# Patient Record
Sex: MALE | Race: WHITE | NOT HISPANIC OR LATINO | Employment: OTHER | URBAN - NONMETROPOLITAN AREA
[De-identification: names, ages, dates, MRNs, and addresses within clinical notes are randomized per-mention and may not be internally consistent; named-entity substitution may affect disease eponyms.]

---

## 2017-03-14 ENCOUNTER — OFFICE VISIT (OUTPATIENT)
Dept: CARDIOLOGY | Facility: CLINIC | Age: 72
End: 2017-03-14

## 2017-03-14 VITALS
DIASTOLIC BLOOD PRESSURE: 70 MMHG | HEIGHT: 69 IN | HEART RATE: 75 BPM | WEIGHT: 213 LBS | SYSTOLIC BLOOD PRESSURE: 128 MMHG | BODY MASS INDEX: 31.55 KG/M2

## 2017-03-14 DIAGNOSIS — Z79.899 LONG TERM CURRENT USE OF ANTIARRHYTHMIC MEDICAL THERAPY: ICD-10-CM

## 2017-03-14 DIAGNOSIS — I10 ESSENTIAL HYPERTENSION: ICD-10-CM

## 2017-03-14 DIAGNOSIS — I48.0 PAROXYSMAL ATRIAL FIBRILLATION (HCC): Primary | ICD-10-CM

## 2017-03-14 DIAGNOSIS — E78.00 HYPERCHOLESTEREMIA: ICD-10-CM

## 2017-03-14 PROCEDURE — 93000 ELECTROCARDIOGRAM COMPLETE: CPT | Performed by: NURSE PRACTITIONER

## 2017-03-14 PROCEDURE — 99213 OFFICE O/P EST LOW 20 MIN: CPT | Performed by: NURSE PRACTITIONER

## 2017-03-14 RX ORDER — VALSARTAN 80 MG/1
TABLET ORAL
COMMUNITY
End: 2018-09-27 | Stop reason: DRUGHIGH

## 2017-09-08 ENCOUNTER — TELEPHONE (OUTPATIENT)
Dept: CARDIOLOGY | Facility: CLINIC | Age: 72
End: 2017-09-08

## 2017-09-08 RX ORDER — FLECAINIDE ACETATE 50 MG/1
50 TABLET ORAL EVERY 12 HOURS
Qty: 180 TABLET | Refills: 0 | Status: SHIPPED | OUTPATIENT
Start: 2017-09-08 | End: 2017-09-18 | Stop reason: DRUGHIGH

## 2017-09-18 ENCOUNTER — OFFICE VISIT (OUTPATIENT)
Dept: CARDIOLOGY | Facility: CLINIC | Age: 72
End: 2017-09-18

## 2017-09-18 VITALS
DIASTOLIC BLOOD PRESSURE: 90 MMHG | HEIGHT: 69 IN | SYSTOLIC BLOOD PRESSURE: 138 MMHG | HEART RATE: 76 BPM | BODY MASS INDEX: 31.55 KG/M2 | WEIGHT: 213 LBS

## 2017-09-18 DIAGNOSIS — I48.0 PAF (PAROXYSMAL ATRIAL FIBRILLATION) (HCC): Primary | ICD-10-CM

## 2017-09-18 DIAGNOSIS — E78.49 OTHER HYPERLIPIDEMIA: ICD-10-CM

## 2017-09-18 DIAGNOSIS — I10 ESSENTIAL HYPERTENSION: ICD-10-CM

## 2017-09-18 DIAGNOSIS — I34.0 NON-RHEUMATIC MITRAL REGURGITATION: ICD-10-CM

## 2017-09-18 PROBLEM — E78.5 HYPERLIPEMIA: Status: ACTIVE | Noted: 2017-09-18

## 2017-09-18 PROCEDURE — 99214 OFFICE O/P EST MOD 30 MIN: CPT | Performed by: NURSE PRACTITIONER

## 2017-09-18 PROCEDURE — 93000 ELECTROCARDIOGRAM COMPLETE: CPT | Performed by: NURSE PRACTITIONER

## 2017-09-18 RX ORDER — FOLIC ACID 1 MG/1
1 TABLET ORAL DAILY
COMMUNITY

## 2017-09-18 RX ORDER — FLECAINIDE ACETATE 100 MG/1
TABLET ORAL
Qty: 60 TABLET | Refills: 8 | Status: SHIPPED | OUTPATIENT
Start: 2017-09-18 | End: 2018-03-29 | Stop reason: HOSPADM

## 2017-09-18 RX ORDER — PREDNISONE 1 MG/1
TABLET ORAL
COMMUNITY
End: 2018-03-29 | Stop reason: ALTCHOICE

## 2017-09-18 NOTE — PROGRESS NOTES
Chief Complaint   Patient presents with   • Follow-up     Says he has been feeling like he is in a fib. Denies chest pains. Says he has had shortness of breath, but attributes it to weight. PCP did blood work about month ago, but not in chart.    • Med Refill     Denies needing refills.        Cardiac Complaints  palpitations      Subjective   Jonn Cespedes is a 72 y.o. male with hypertension, hypercholesterolemia, and arrhythmias in the form of PAF.  He has done well with Tambocor.  Most recent cardiac workup in 2016 was negative for ischemic burden and slightly diminished LV function at 46%, home medication was continued.  He comes today for follow up and reports he has been noticing more atrial fibrillation.  He says for the last 3 weeks he has noticed more palpitations/arrythmias.  He has been taking his flecainide three times a day most often.  He is tolerating his eliquis without concern. No chest pain, shortness of breath, and dizziness reported. He does admit to an issue with blood clot in his right leg this summer after a long trip and flying.  He had been anticoagulation and says has resolved.  He is now following Dr. Patterson in regards to rheumatoid arthritis and is now taking methotrexate.  Labs are done with PCP and Dr. Patterson he states most recent were a month ago.  No refills are needed.         Cardiac History  Past Surgical History:   Procedure Laterality Date   • CARDIOVASCULAR STRESS TEST  01/11/2012    Stress-6min, 89%THR, 170-76, EF 46%, Negative   • CARDIOVASCULAR STRESS TEST  08/11/2016    6 min 16 sec, 87% THr, 172/76, no definite ischemia   • CARDIOVASCULAR STRESS TEST  08/11/2016    EF 46%, no ischemia   • CATH LAB PROCEDURE  01/30/2012    Cath-Normal Coronaries. EF 45%   • CHOLECYSTECTOMY     • CONVERTED (HISTORICAL) HOLTER  10/25/2011    Holter-freq PAC's and PVC's, freq runs V-tach   • ECHO - CONVERTED  10/28/2011    Echo=EF 50%. RVSP-47mmHg.   • ECHO - CONVERTED  07/23/2013    Echo-EF  60-65%, mild to mod MR.   • ECHO - CONVERTED  08/09/2016    EF 60-65%, mild MR, mild AR, RVSP 30-35 mmHg   • EP STUDY  12/20/2011    EP Study () no inducible V-tach, multifocal PACs and atrial fibrillation       Current Outpatient Prescriptions   Medication Sig Dispense Refill   • apixaban (ELIQUIS) 5 MG tablet tablet Take 5 mg by mouth 2 (Two) Times a Day.     • folic acid (FOLVITE) 1 MG tablet Take 1 mg by mouth Daily.     • methotrexate (RHEUMATREX) 2.5 MG tablet Take 5 tablets by mouth once a week.     • pravastatin (PRAVACHOL) 40 MG tablet Take 1 tablet by mouth daily. 90 tablet 3   • predniSONE (DELTASONE) 5 MG tablet Take 2 tablets by mouth once a day.     • valsartan (DIOVAN) 80 MG tablet 2 tabs daily     • aspirin 81 MG tablet Take 2 tablets by mouth 1 (One) Time for 1 dose. 30 tablet 11   • flecainide (TAMBOCOR) 100 MG tablet Take whole tablet in AM and 1/2 tablet PM 60 tablet 8     No current facility-administered medications for this visit.        Review of patient's allergies indicates no known allergies.    Past Medical History:   Diagnosis Date   • A-fib    • Deep vein thrombosis    • History of cholecystectomy    • Hypertension    • RA (rheumatoid arthritis)        Social History     Social History   • Marital status:      Spouse name: N/A   • Number of children: N/A   • Years of education: N/A     Occupational History   • Not on file.     Social History Main Topics   • Smoking status: Never Smoker   • Smokeless tobacco: Never Used   • Alcohol use No   • Drug use: No   • Sexual activity: Not on file     Other Topics Concern   • Not on file     Social History Narrative       Family History   Problem Relation Age of Onset   • No Known Problems Mother        Review of Systems   Constitution: Negative for weakness and malaise/fatigue.   Cardiovascular: Positive for irregular heartbeat and palpitations. Negative for chest pain, dyspnea on exertion, near-syncope and syncope.  "  Respiratory: Negative for shortness of breath and wheezing.    Musculoskeletal: Positive for arthritis, joint pain and joint swelling.   Gastrointestinal: Negative for anorexia, heartburn, melena, nausea and vomiting.   Genitourinary: Negative for dysuria, hematuria and nocturia.   Neurological: Negative for dizziness, focal weakness and light-headedness.   Psychiatric/Behavioral: Negative for altered mental status and depression.       DiabetesNo  Thyroidnormal    Objective     /90 (BP Location: Left arm)  Pulse 76  Ht 69\" (175.3 cm)  Wt 213 lb (96.6 kg)  BMI 31.45 kg/m2    Physical Exam   Constitutional: He is oriented to person, place, and time. He appears well-developed and well-nourished.   HENT:   Head: Normocephalic and atraumatic.   Eyes: EOM are normal. Pupils are equal, round, and reactive to light.   Neck: Normal range of motion. Neck supple.   Cardiovascular: Normal rate and regular rhythm.    Murmur heard.  Pulmonary/Chest: Effort normal and breath sounds normal.   Abdominal: Soft. Bowel sounds are normal.   Musculoskeletal: Normal range of motion.   Neurological: He is alert and oriented to person, place, and time.   Skin: Skin is warm and dry.   Psychiatric: He has a normal mood and affect.         ECG 12 Lead  Date/Time: 9/18/2017 2:43 PM  Performed by: VIDHI KENNEDY  Authorized by: VIDHI KENNEDY   Rhythm: sinus rhythm  BPM: 76  Clinical impression: normal ECG            Assessment/Plan     HR and BP are both stable.  EKG done today for PAF and tambocor therapy shows a NSR with normal QT.  Since he does report some issues with more frequent palpitations, we will advie to increase his tambocor to 100mg in AM and 1/2 tablet in PM.  He will return next week for EKG.  No new workup will be advised as he denies any new concerns.  Labs are done with your office and rheumatology, he states most recent done about a month ago.  Could we have a copy for our records?  He states his right leg " pain has subsided since DVT in August.  Since he is now on concomitant therapy with methotrexate as well as aspirin and eliquis, we will decrease his aspirin to 81mg BID. If he does well and no more issues with clotting, most likely we will cut his aspirin to 81 mg daily.  Good cardiac diet and activity as tolerated advised.  6 month follow up advised or sooner if needed.      Problems Addressed this Visit        Cardiovascular and Mediastinum    PAF (paroxysmal atrial fibrillation) - Primary    Relevant Orders    ECG 12 Lead    HTN (hypertension)    Hyperlipemia    Non-rheumatic mitral regurgitation                  Electronically signed by MARINO Ann September 18, 2017 4:52 PM

## 2017-09-25 ENCOUNTER — TELEPHONE (OUTPATIENT)
Dept: CARDIOLOGY | Facility: CLINIC | Age: 72
End: 2017-09-25

## 2017-09-25 ENCOUNTER — CLINICAL SUPPORT (OUTPATIENT)
Dept: CARDIOLOGY | Facility: CLINIC | Age: 72
End: 2017-09-25

## 2017-09-25 DIAGNOSIS — I48.0 PAF (PAROXYSMAL ATRIAL FIBRILLATION) (HCC): Primary | ICD-10-CM

## 2017-09-25 PROCEDURE — 93000 ELECTROCARDIOGRAM COMPLETE: CPT | Performed by: NURSE PRACTITIONER

## 2017-09-25 NOTE — TELEPHONE ENCOUNTER
Patient aware of EKG results and says palpitations have improved. He will continue medications as prescribed.

## 2017-09-25 NOTE — PROGRESS NOTES
Procedure     ECG 12 Lead  Date/Time: 9/25/2017 9:43 AM  Performed by: VIDHI KENNEDY  Authorized by: VIDHI KENNEDY   Rhythm: sinus rhythm  BPM: 68  Clinical impression: normal ECG

## 2018-03-29 ENCOUNTER — OFFICE VISIT (OUTPATIENT)
Dept: CARDIOLOGY | Facility: CLINIC | Age: 73
End: 2018-03-29

## 2018-03-29 VITALS
WEIGHT: 199 LBS | DIASTOLIC BLOOD PRESSURE: 82 MMHG | HEART RATE: 67 BPM | HEIGHT: 69 IN | BODY MASS INDEX: 29.47 KG/M2 | SYSTOLIC BLOOD PRESSURE: 124 MMHG

## 2018-03-29 DIAGNOSIS — I10 ESSENTIAL HYPERTENSION: ICD-10-CM

## 2018-03-29 DIAGNOSIS — I34.0 NON-RHEUMATIC MITRAL REGURGITATION: ICD-10-CM

## 2018-03-29 DIAGNOSIS — E66.3 OVERWEIGHT (BMI 25.0-29.9): ICD-10-CM

## 2018-03-29 DIAGNOSIS — I48.0 PAF (PAROXYSMAL ATRIAL FIBRILLATION) (HCC): Primary | ICD-10-CM

## 2018-03-29 DIAGNOSIS — E78.49 OTHER HYPERLIPIDEMIA: ICD-10-CM

## 2018-03-29 DIAGNOSIS — R00.2 PALPITATIONS: ICD-10-CM

## 2018-03-29 PROCEDURE — 93000 ELECTROCARDIOGRAM COMPLETE: CPT | Performed by: NURSE PRACTITIONER

## 2018-03-29 PROCEDURE — 99214 OFFICE O/P EST MOD 30 MIN: CPT | Performed by: NURSE PRACTITIONER

## 2018-03-29 RX ORDER — FLECAINIDE ACETATE 50 MG/1
50 TABLET ORAL 2 TIMES DAILY
Qty: 180 TABLET | Refills: 3 | Status: SHIPPED | OUTPATIENT
Start: 2018-03-29 | End: 2018-09-27 | Stop reason: SDUPTHER

## 2018-03-29 RX ORDER — ASPIRIN 325 MG
325 TABLET, DELAYED RELEASE (ENTERIC COATED) ORAL DAILY
COMMUNITY
End: 2018-03-29 | Stop reason: DRUGHIGH

## 2018-03-29 NOTE — PROGRESS NOTES
Chief Complaint   Patient presents with   • Follow-up     For cardiac management. Reports he is no long on Eliquis per PCP, is now taking aspirin 325 mg QD. Reports he rarely get palpitations.    • Med Refill     Reports that he has been taking flecainide 50 mg BID instead of 100 mg in the morning and 50 mg at night. Wondering if he could get new script?       Cardiac Complaints  palpitations      Subjective   Jonn Cespedes is a 73 y.o. male with hypertension, hypercholesterolemia, and arrhythmias in the form of PAF.  He has done well with Tambocor.  Most recent cardiac workup in 2016 was negative for ischemic burden and slightly diminished LV function at 46%, home medication was continued.  At last visit, he had been noticing more atrial fibrillation, and was taking flecainide 3 times a day most often.  Flecainide was increased at that time to 100mg in AM and 1/2 tablet in the PM.  He returns today for follow up and denies any new cardiac concerns.  He states that his flecainide has been decreased by him as he was having side effects.  He does report PCP recently stopped his eliquis and told him to take ASA alone.  His CHADsvasc2 score is 2.  He does state he continually has palpitations but they are no worse than before and actually have improved. Labs he reports with PCP, no recent copy is available for review.  Refills of flecainide requested.       Cardiac History  Past Surgical History:   Procedure Laterality Date   • CARDIOVASCULAR STRESS TEST  01/11/2012    Stress-6min, 89%THR, 170-76, EF 46%, Negative   • CARDIOVASCULAR STRESS TEST  08/11/2016    6 min 16 sec, 87% THr, 172/76, no definite ischemia   • CARDIOVASCULAR STRESS TEST  08/11/2016    EF 46%, no ischemia   • CATH LAB PROCEDURE  01/30/2012    Cath-Normal Coronaries. EF 45%   • CHOLECYSTECTOMY     • CONVERTED (HISTORICAL) HOLTER  10/25/2011    Holter-freq PAC's and PVC's, freq runs V-tach   • ECHO - CONVERTED  10/28/2011    Echo=EF 50%.  RVSP-47mmHg.   • ECHO - CONVERTED  07/23/2013    Echo-EF 60-65%, mild to mod MR.   • ECHO - CONVERTED  08/09/2016    EF 60-65%, mild MR, mild AR, RVSP 30-35 mmHg   • EP STUDY  12/20/2011    EP Study () no inducible V-tach, multifocal PACs and atrial fibrillation       Current Outpatient Prescriptions   Medication Sig Dispense Refill   • folic acid (FOLVITE) 1 MG tablet Take 1 mg by mouth Daily.     • methotrexate (RHEUMATREX) 2.5 MG tablet Take 5 tablets by mouth once a week.     • pravastatin (PRAVACHOL) 40 MG tablet Take 1 tablet by mouth daily. 90 tablet 3   • valsartan (DIOVAN) 80 MG tablet 2 tabs daily     • apixaban (ELIQUIS) 5 MG tablet tablet Take 1 tablet by mouth Every 12 (Twelve) Hours. 60 tablet 0   • aspirin 81 MG tablet Take 1 tablet by mouth Daily. 30 tablet 11   • flecainide (TAMBOCOR) 50 MG tablet Take 1 tablet by mouth 2 (Two) Times a Day. 180 tablet 3     No current facility-administered medications for this visit.        Review of patient's allergies indicates no known allergies.    Past Medical History:   Diagnosis Date   • A-fib    • Deep vein thrombosis    • History of cholecystectomy    • Hypertension    • RA (rheumatoid arthritis)        Social History     Social History   • Marital status:      Spouse name: N/A   • Number of children: N/A   • Years of education: N/A     Occupational History   • Not on file.     Social History Main Topics   • Smoking status: Never Smoker   • Smokeless tobacco: Never Used   • Alcohol use No   • Drug use: No   • Sexual activity: Not on file     Other Topics Concern   • Not on file     Social History Narrative   • No narrative on file       Family History   Problem Relation Age of Onset   • No Known Problems Mother        Review of Systems   Constitution: Negative for weakness and malaise/fatigue.   Cardiovascular: Positive for palpitations. Negative for chest pain, dyspnea on exertion, irregular heartbeat, leg swelling, near-syncope and  "syncope.   Respiratory: Negative for shortness of breath and wheezing.    Musculoskeletal: Negative for back pain, joint pain and joint swelling.   Gastrointestinal: Negative for anorexia, heartburn, melena and nausea.   Genitourinary: Negative for dysuria, hematuria, hesitancy and nocturia.   Neurological: Negative for headaches, light-headedness, numbness and paresthesias.   Psychiatric/Behavioral: Negative for depression and memory loss. The patient is not nervous/anxious.        DiabetesNo  Thyroidnormal    Objective     /82 (BP Location: Left arm)   Pulse 67   Ht 175.3 cm (69.02\")   Wt 90.3 kg (199 lb)   BMI 29.37 kg/m²     Physical Exam   Constitutional: He is oriented to person, place, and time. He appears well-developed and well-nourished.   HENT:   Head: Normocephalic and atraumatic.   Eyes: EOM are normal. Pupils are equal, round, and reactive to light.   Neck: Normal range of motion. Neck supple.   Cardiovascular: Normal rate and regular rhythm.    Murmur heard.  Pulmonary/Chest: Effort normal and breath sounds normal.   Abdominal: Soft.   Musculoskeletal: Normal range of motion.   Neurological: He is alert and oriented to person, place, and time.   Skin: Skin is warm and dry.   Psychiatric: He has a normal mood and affect. His behavior is normal.         ECG 12 Lead  Date/Time: 3/29/2018 10:46 AM  Performed by: VIDHI KENNEDY  Authorized by: VIDHI KENNEDY   Rhythm: sinus rhythm  BPM: 67  Clinical impression: normal ECG  Comments: Normal QT            Assessment/Plan     HR and BP are stable today.  EKG done today for PAF showed NSR with normal QT.  He is on flecainide at 50mg BID, he decreased it himself, but he is tolerating well, says palpitations have been well controlled. He remains on diovan for HTN and his blood pressure is well managed.  He is currently on ASA therapy alone at 325mg EC alone since he stopped his eliquis after 6 months for DVT.  We discussed that he needed to drop " his ASA 81mg daily and restart eliquis 5mg BID for CHADS vasc score 2. He is agreeable to restart. He has medication at home and will restart as bleeding or side effects with eliquis denied.  Labs are done with your office, could we get most recent copy for our records?  Refills of both tambocor and eliquis are requested. BMI elevated today at 29, patient encouraged to decrease caloric intake, carbs, starches, and increase physical activity with a walking plan advised for 30 minutes 3-4 times weekly.  No new testing encouraged today as no new problems reported.  6 month follow up advised or sooner if needed.       Problems Addressed this Visit        Cardiovascular and Mediastinum    PAF (paroxysmal atrial fibrillation) - Primary    HTN (hypertension)    Hyperlipemia    Non-rheumatic mitral regurgitation      Other Visit Diagnoses     Overweight (BMI 25.0-29.9)        Palpitations              Discussed the patient's BMI with him. BMI is above normal parameters. Follow-up plan includes:  nutrition counseling.        Electronically signed by MARINO Ann March 29, 2018 10:56 AM

## 2018-06-27 ENCOUNTER — TELEPHONE (OUTPATIENT)
Dept: CARDIOLOGY | Facility: CLINIC | Age: 73
End: 2018-06-27

## 2018-06-27 NOTE — TELEPHONE ENCOUNTER
Patient called needing refills on Eliquis. Script sent to [facility].

## 2018-09-27 ENCOUNTER — OFFICE VISIT (OUTPATIENT)
Dept: CARDIOLOGY | Facility: CLINIC | Age: 73
End: 2018-09-27

## 2018-09-27 VITALS
HEART RATE: 61 BPM | DIASTOLIC BLOOD PRESSURE: 70 MMHG | HEIGHT: 69 IN | BODY MASS INDEX: 30.36 KG/M2 | WEIGHT: 205 LBS | SYSTOLIC BLOOD PRESSURE: 132 MMHG

## 2018-09-27 DIAGNOSIS — I48.0 PAF (PAROXYSMAL ATRIAL FIBRILLATION) (HCC): Primary | ICD-10-CM

## 2018-09-27 DIAGNOSIS — Z79.01 CURRENT USE OF LONG TERM ANTICOAGULATION: ICD-10-CM

## 2018-09-27 DIAGNOSIS — E78.49 OTHER HYPERLIPIDEMIA: ICD-10-CM

## 2018-09-27 DIAGNOSIS — Z79.899 LONG TERM CURRENT USE OF ANTIARRHYTHMIC DRUG: ICD-10-CM

## 2018-09-27 DIAGNOSIS — I10 ESSENTIAL HYPERTENSION: ICD-10-CM

## 2018-09-27 DIAGNOSIS — I34.0 NON-RHEUMATIC MITRAL REGURGITATION: ICD-10-CM

## 2018-09-27 PROCEDURE — 99213 OFFICE O/P EST LOW 20 MIN: CPT | Performed by: NURSE PRACTITIONER

## 2018-09-27 PROCEDURE — 93000 ELECTROCARDIOGRAM COMPLETE: CPT | Performed by: NURSE PRACTITIONER

## 2018-09-27 RX ORDER — VALSARTAN 160 MG/1
160 TABLET ORAL DAILY
COMMUNITY
End: 2018-09-27 | Stop reason: SDUPTHER

## 2018-09-27 RX ORDER — PRAVASTATIN SODIUM 40 MG
40 TABLET ORAL DAILY
Qty: 90 TABLET | Refills: 2 | Status: SHIPPED | OUTPATIENT
Start: 2018-09-27 | End: 2019-04-10 | Stop reason: SDUPTHER

## 2018-09-27 RX ORDER — FLECAINIDE ACETATE 50 MG/1
TABLET ORAL
Qty: 180 TABLET | Refills: 2 | Status: SHIPPED | OUTPATIENT
Start: 2018-09-27 | End: 2019-05-02 | Stop reason: SDUPTHER

## 2018-09-27 RX ORDER — VALSARTAN 160 MG/1
160 TABLET ORAL DAILY
Qty: 90 TABLET | Refills: 2 | Status: SHIPPED | OUTPATIENT
Start: 2018-09-27 | End: 2019-05-02 | Stop reason: ALTCHOICE

## 2018-09-27 RX ORDER — ASPIRIN 325 MG
325 TABLET, DELAYED RELEASE (ENTERIC COATED) ORAL DAILY
COMMUNITY
End: 2018-09-27 | Stop reason: DRUGHIGH

## 2018-09-27 NOTE — PROGRESS NOTES
"Chief Complaint   Patient presents with   • Follow-up     Cardiac management. He reports will at times have some AFib, yet not often. No labs in the last year. He states \"I decreased my Eliquis to 2.5mg bid due to headaches and increased my aspirin to 325mg daily\".   • Shortness of Breath     He states \"its the same, nothing unusual\".   • Med Refill     Needs refills on cardiac medication-90 day.       Subjective       Jonn Cespedes is a 73 y.o. male with hypertension, hypercholesterolemia, and arrhythmias in the form of PAF. He also had short runs of VT and underwent EP study in 2011 but no inducible VT but multiple PAC and AF.  He has done well with Tambocor.  Most recent cardiac workup in 2016 was negative for ischemia and slightly diminished LV function at 46%, home medication was continued. He is taking flecainide 100 mg in the morning and 50 mg in the evening. Occasionally he will take an extra 50 mg at night. Today he came for follow up. He denies chest pain or shortness of breath. Palpitations occur once every couple of weeks and he continues to take an extra 50 mg at night which helps. His CHADsVASc score is 2. He has reduced Eliquis to 2.5 mg and increased aspirin to 325 mg. Labs are followed with Dr. Weiss but none in the last one year. He plans to follow up with him soon and will forward copy to us. 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 Refills requested. RA is followed by Dr. Patterson.     HPI         Cardiac History:    Past Surgical History:   Procedure Laterality Date   • CARDIAC CATHETERIZATION  01/30/2012    Cath-Normal Coronaries. EF 45%   • CARDIOVASCULAR STRESS TEST  01/11/2012    Stress-6min, 89%THR, 170-76, EF 46%, Negative   • CARDIOVASCULAR STRESS TEST  08/11/2016    6 min 16 sec, 87% THr, 172/76, no definite ischemia   • CARDIOVASCULAR STRESS TEST  08/11/2016    EF 46%, no ischemia   • CONVERTED (HISTORICAL) HOLTER  10/25/2011    Holter-freq PAC's and PVC's, freq " runs V-tach   • ECHO - CONVERTED  10/28/2011    Echo=EF 50%. RVSP-47mmHg.   • ECHO - CONVERTED  07/23/2013    Echo-EF 60-65%, mild to mod MR.   • ECHO - CONVERTED  08/09/2016    EF 60-65%, mild MR, mild AR, RVSP 30-35 mmHg   • EP STUDY  12/20/2011    EP Study () no inducible V-tach, multifocal PACs and atrial fibrillation       Current Outpatient Prescriptions   Medication Sig Dispense Refill   • apixaban (ELIQUIS) 5 MG tablet tablet Take 1 tablet by mouth Every 12 (Twelve) Hours. 180 tablet 2   • flecainide (TAMBOCOR) 50 MG tablet Takes 2 tablets in am and 1 tablet at night 180 tablet 2   • folic acid (FOLVITE) 1 MG tablet Take 1 mg by mouth Daily.     • methotrexate (RHEUMATREX) 2.5 MG tablet Take 5 tablets by mouth once a week.     • pravastatin (PRAVACHOL) 40 MG tablet Take 1 tablet by mouth Daily. 90 tablet 2   • valsartan (DIOVAN) 160 MG tablet Take 1 tablet by mouth Daily. 90 tablet 2   • aspirin 81 MG tablet Take 1 tablet by mouth Daily. 30 tablet 11     No current facility-administered medications for this visit.        Patient has no known allergies.    Past Medical History:   Diagnosis Date   • A-fib (CMS/HCC)    • Deep vein thrombosis (CMS/HCC)    • History of cholecystectomy    • Hypertension    • RA (rheumatoid arthritis) (CMS/HCC)        Social History     Social History   • Marital status:      Spouse name: N/A   • Number of children: N/A   • Years of education: N/A     Occupational History   • Not on file.     Social History Main Topics   • Smoking status: Never Smoker   • Smokeless tobacco: Never Used   • Alcohol use No   • Drug use: No   • Sexual activity: Not on file     Other Topics Concern   • Not on file     Social History Narrative   • No narrative on file       Family History   Problem Relation Age of Onset   • No Known Problems Mother        Review of Systems   Constitution: Positive for weight gain (up 6 lb ). Negative for weakness and malaise/fatigue.   HENT: Negative.   "  Eyes: Negative.    Cardiovascular: Positive for palpitations. Negative for chest pain, dyspnea on exertion, leg swelling, orthopnea and syncope.   Respiratory: Negative for cough and shortness of breath.    Endocrine: Negative.    Hematologic/Lymphatic: Negative.  Negative for bleeding problem. Does not bruise/bleed easily.   Skin: Negative.    Musculoskeletal: Positive for arthritis and joint pain. Negative for myalgias.   Gastrointestinal: Negative for abdominal pain and melena.   Genitourinary: Negative for dysuria and hematuria.   Neurological: Negative for dizziness.   Psychiatric/Behavioral: Negative for altered mental status and depression.   Allergic/Immunologic: Negative.       Diabetes- No  Thyroid-normal    Objective     /70 (BP Location: Left arm)   Pulse 61   Ht 175.3 cm (69.02\")   Wt 93 kg (205 lb)   BMI 30.26 kg/m²     Physical Exam   Constitutional: He is oriented to person, place, and time. He appears well-developed and well-nourished.   HENT:   Head: Normocephalic.   Eyes: Pupils are equal, round, and reactive to light.   Neck: Normal range of motion.   Cardiovascular: Normal rate, regular rhythm and intact distal pulses.    Pulmonary/Chest: Effort normal and breath sounds normal. No respiratory distress.   Abdominal: Soft. Bowel sounds are normal. He exhibits no distension.   Musculoskeletal: Normal range of motion. He exhibits no edema.   Neurological: He is alert and oriented to person, place, and time.   Skin: Skin is warm and dry.   Psychiatric: He has a normal mood and affect.   Nursing note and vitals reviewed.       ECG 12 Lead  Date/Time: 9/27/2018 10:09 AM  Performed by: VERONICA LINARES  Authorized by: VERONICA LINARES   Comparison: compared with previous ECG from 3/29/2018  Similar to previous ECG  Rhythm: sinus rhythm  Rate: normal  BPM: 61  ST Segments: ST segments normal  QRS axis: normal  Clinical impression: normal ECG  Comments:  ms  QTc 442 ms            "     Assessment/Plan    Heart rate and blood pressure stable. EKG for PAF on flecainide showed NSR at 61 bpm, normal MI and QTc intervals. He is advised to continue flecainide at same dose. Can take extra dose of 50 mg as needed. Eliquis is advised at 5 mg BID for CHADsVASc of 2 with normal body weight and renal function. Could we get copy of next labs? Continue pravastatin for hyperlipidemia since he tolerates. No lipids available to evaluate efficacy. He was encouraged to remain active with regular walking. Heart healthy diet, low in saturated fats, sodium, and sugar. He appears stable. We will see him back in six months or sooner if needed.   Jonn was seen today for follow-up, shortness of breath and med refill.    Diagnoses and all orders for this visit:    PAF (paroxysmal atrial fibrillation) (CMS/[facility])    Essential hypertension    Other hyperlipidemia    Non-rheumatic mitral regurgitation    Current use of long term anticoagulation    Long term current use of antiarrhythmic drug    Other orders  -     valsartan (DIOVAN) 160 MG tablet; Take 1 tablet by mouth Daily.  -     flecainide (TAMBOCOR) 50 MG tablet; Takes 2 tablets in am and 1 tablet at night  -     apixaban (ELIQUIS) 5 MG tablet tablet; Take 1 tablet by mouth Every 12 (Twelve) Hours.  -     pravastatin (PRAVACHOL) 40 MG tablet; Take 1 tablet by mouth Daily.  -     aspirin 81 MG tablet; Take 1 tablet by mouth Daily.  -     ECG 12 Lead        Patient's Body mass index is 30.26 kg/m². BMI is above normal parameters. Recommendations include: nutrition counseling.                    Electronically signed by MARINO Jimenes,  September 27, 2018 10:15 AM

## 2019-04-10 RX ORDER — PRAVASTATIN SODIUM 40 MG
TABLET ORAL
Qty: 90 TABLET | Refills: 2 | Status: SHIPPED | OUTPATIENT
Start: 2019-04-10 | End: 2019-05-02 | Stop reason: SDUPTHER

## 2019-05-02 ENCOUNTER — OFFICE VISIT (OUTPATIENT)
Dept: CARDIOLOGY | Facility: CLINIC | Age: 74
End: 2019-05-02

## 2019-05-02 VITALS
HEART RATE: 67 BPM | BODY MASS INDEX: 23.85 KG/M2 | DIASTOLIC BLOOD PRESSURE: 60 MMHG | HEIGHT: 69 IN | SYSTOLIC BLOOD PRESSURE: 108 MMHG | WEIGHT: 161 LBS

## 2019-05-02 DIAGNOSIS — R63.4 WEIGHT LOSS: ICD-10-CM

## 2019-05-02 DIAGNOSIS — R00.2 PALPITATIONS: ICD-10-CM

## 2019-05-02 DIAGNOSIS — Z79.01 CURRENT USE OF LONG TERM ANTICOAGULATION: ICD-10-CM

## 2019-05-02 DIAGNOSIS — Z79.899 LONG TERM CURRENT USE OF ANTIARRHYTHMIC DRUG: ICD-10-CM

## 2019-05-02 DIAGNOSIS — I10 ESSENTIAL HYPERTENSION: ICD-10-CM

## 2019-05-02 DIAGNOSIS — I48.0 PAF (PAROXYSMAL ATRIAL FIBRILLATION) (HCC): Primary | ICD-10-CM

## 2019-05-02 DIAGNOSIS — E78.2 MIXED HYPERLIPIDEMIA: ICD-10-CM

## 2019-05-02 PROCEDURE — 93000 ELECTROCARDIOGRAM COMPLETE: CPT | Performed by: NURSE PRACTITIONER

## 2019-05-02 PROCEDURE — 99214 OFFICE O/P EST MOD 30 MIN: CPT | Performed by: NURSE PRACTITIONER

## 2019-05-02 RX ORDER — PRAVASTATIN SODIUM 40 MG
40 TABLET ORAL DAILY
Qty: 90 TABLET | Refills: 2 | Status: SHIPPED | OUTPATIENT
Start: 2019-05-02 | End: 2020-08-10 | Stop reason: SDUPTHER

## 2019-05-02 RX ORDER — VALSARTAN 80 MG/1
80 TABLET ORAL DAILY
Qty: 90 TABLET | Refills: 2 | Status: SHIPPED | OUTPATIENT
Start: 2019-05-02 | End: 2020-08-10 | Stop reason: SDUPTHER

## 2019-05-02 RX ORDER — VALSARTAN 160 MG/1
80 TABLET ORAL DAILY
COMMUNITY
End: 2019-05-02 | Stop reason: DRUGHIGH

## 2019-05-02 RX ORDER — FLECAINIDE ACETATE 50 MG/1
TABLET ORAL
Qty: 270 TABLET | Refills: 2 | Status: SHIPPED | OUTPATIENT
Start: 2019-05-02 | End: 2019-06-11 | Stop reason: SDUPTHER

## 2019-05-02 NOTE — PROGRESS NOTES
Chief Complaint   Patient presents with   • Follow-up     For cardiac management. Patient has not been taking aspirin. Reports that he is only taking 80 mg of valsartan. Reports that he occasionally feels his heart out of rhythm. Last lab work was done about 6 months ago per PCP, not in chart.    • Med Refill     Needs refills on cardiac medications. 90 day supplies to [facility].       Cardiac Complaints  palpitations      Subjective   Jonn Cespedes is a 74 y.o. male with hypertension, hypercholesterolemia, and arrhythmias in the form of PAF. He also had short runs of VT and underwent EP study in 2011 but no inducible VT but multiple PAC and AF.  He has done well with Tambocor.  Most recent cardiac workup in 2016 was negative for ischemia and slightly diminished LV function at 46%, home medication was continued. He is taking flecainide 100 mg in the morning and 50 mg in the evening. Occasionally he will take an extra 50 mg at night. His CHADsVASc score is 2. He had reduced Eliquis to 2.5 mg and increased aspirin to 325 mg, but then decreased to 81mg daily since and done well since he increased eliquis to 5mg BID.  He returns today for follow up and cardiac concerns are denied. Patient reports doing very well and is very active on his [place] without problems.  He admits to being very ambitious doing tasks such as fencing, building, and gardening all day without concern.  He reports an occasional skip/palpitation but reports it is very short lived and is gone as quick as it is noticed. He denies any recent use of an extra tambocor.  TIA symptoms are denied as well as dizziness, chest pain, and shortness of breath.  Labs he admits are done with PCP, no copies are available.  Refills of cardiac med requested.        Cardiac History  Past Surgical History:   Procedure Laterality Date   • CARDIAC CATHETERIZATION  01/30/2012    Cath-Normal Coronaries. EF 45%   • CARDIOVASCULAR STRESS TEST  01/11/2012     Stress-6min, 89%THR, 170-76, EF 46%, Negative   • CARDIOVASCULAR STRESS TEST  08/11/2016    6 min 16 sec, 87% THr, 172/76, no definite ischemia   • CARDIOVASCULAR STRESS TEST  08/11/2016    EF 46%, no ischemia   • CONVERTED (HISTORICAL) HOLTER  10/25/2011    Holter-freq PAC's and PVC's, freq runs V-tach   • ECHO - CONVERTED  10/28/2011    Echo=EF 50%. RVSP-47mmHg.   • ECHO - CONVERTED  07/23/2013    Echo-EF 60-65%, mild to mod MR.   • ECHO - CONVERTED  08/09/2016    EF 60-65%, mild MR, mild AR, RVSP 30-35 mmHg   • EP STUDY  12/20/2011    EP Study () no inducible V-tach, multifocal PACs and atrial fibrillation       Current Outpatient Medications   Medication Sig Dispense Refill   • apixaban (ELIQUIS) 5 MG tablet tablet Take 1 tablet by mouth Every 12 (Twelve) Hours. 180 tablet 2   • flecainide (TAMBOCOR) 50 MG tablet Takes 2 tablets in am and 1 tablet at night 270 tablet 2   • folic acid (FOLVITE) 1 MG tablet Take 1 mg by mouth Daily.     • methotrexate (RHEUMATREX) 2.5 MG tablet Take 5 tablets by mouth once a week.     • pravastatin (PRAVACHOL) 40 MG tablet Take 1 tablet by mouth Daily. 90 tablet 2   • aspirin 81 MG tablet Take 1 tablet by mouth Daily. 30 tablet 11   • valsartan (DIOVAN) 80 MG tablet Take 1 tablet by mouth Daily. 90 tablet 2     No current facility-administered medications for this visit.        Patient has no known allergies.    Past Medical History:   Diagnosis Date   • A-fib (CMS/HCC)    • Deep vein thrombosis (CMS/HCC)    • History of cholecystectomy    • Hypertension    • RA (rheumatoid arthritis) (CMS/HCC)        Social History     Socioeconomic History   • Marital status:      Spouse name: Not on file   • Number of children: Not on file   • Years of education: Not on file   • Highest education level: Not on file   Tobacco Use   • Smoking status: Never Smoker   • Smokeless tobacco: Never Used   Substance and Sexual Activity   • Alcohol use: No   • Drug use: No       Family  "History   Problem Relation Age of Onset   • No Known Problems Mother        Review of Systems   Constitution: Negative for weakness and malaise/fatigue.   Cardiovascular: Positive for irregular heartbeat and palpitations. Negative for chest pain, claudication, dyspnea on exertion, leg swelling, near-syncope, orthopnea and syncope.   Respiratory: Negative for cough, shortness of breath and wheezing.    Musculoskeletal: Negative for back pain, joint pain and joint swelling.   Gastrointestinal: Negative for anorexia, heartburn, nausea and vomiting.   Genitourinary: Negative for dysuria, hematuria, hesitancy and nocturia.   Neurological: Negative for dizziness, headaches, loss of balance and numbness.   Psychiatric/Behavioral: Negative for depression and memory loss. The patient is not nervous/anxious.            Objective     /60 (BP Location: Left arm)   Pulse 67   Ht 175.3 cm (69.02\")   Wt 73 kg (161 lb)   BMI 23.76 kg/m²     Physical Exam   Constitutional: He is oriented to person, place, and time. He appears well-developed and well-nourished.   HENT:   Head: Normocephalic and atraumatic.   Eyes: EOM are normal. Pupils are equal, round, and reactive to light.   Neck: Normal range of motion. Neck supple.   Cardiovascular: Normal rate and regular rhythm.   Murmur heard.  Pulmonary/Chest: Effort normal and breath sounds normal.   Abdominal: Soft.   Musculoskeletal: Normal range of motion.   Neurological: He is alert and oriented to person, place, and time.   Skin: Skin is warm and dry.   Psychiatric: He has a normal mood and affect. His behavior is normal.         ECG 12 Lead  Date/Time: 5/2/2019 10:05 AM  Performed by: Amy Madrigal APRN  Authorized by: Amy Madrigal APRN   Rhythm: sinus rhythm  BPM: 67    Clinical impression: normal ECG            Assessment/Plan     HR is stable today and regular.  HTN well managed.  Patient encouraged to decrease his valsartan to 40mg daily and continue to " monitor.  EKG done today for PAF managed with tambocor therapy along with low dose eliquis and ASA shows a NSR with normal QT.  He will continue on current anti arrhythmic therapy as rhythm is regular and bleeding and bruising are denied on anticoagulation therapy.  Patient will continue to be advised to use an extra dose of tambocor if needed for palpitations/afib.  Labs he admits are followed closely with your office could we have next for our review, including FLP which he reports you as monitoring?  He will continue on his pravastatin in regards to hyperlipidemia management as tolerance is reported and patient reports most recent FLP within normal range.  BMI noted at 23.76 but patient reports weight loss as intentional as he is closely monitoring his food intake, carbs, and calories.  He was urged to begin to limit his weight loss as BMI is now below 25 and just try and maintain where he is at with good healthy DASH diet guidelines.  6 month follow up recommended or sooner if needed.  Cardiac refills sent per request.        Problems Addressed this Visit        Cardiovascular and Mediastinum    PAF (paroxysmal atrial fibrillation) (CMS/HCC) - Primary    Relevant Orders    ECG 12 Lead    HTN (hypertension)    Relevant Medications    valsartan (DIOVAN) 80 MG tablet    Hyperlipemia    Relevant Medications    pravastatin (PRAVACHOL) 40 MG tablet       Other    Current use of long term anticoagulation    Long term current use of antiarrhythmic drug      Other Visit Diagnoses     Palpitations        Weight loss              Patient's Body mass index is 23.76 kg/m². BMI is within normal parameters. No follow-up required..            Electronically signed by MARINO Ann May 3, 2019 12:45 PM

## 2019-06-11 RX ORDER — FLECAINIDE ACETATE 50 MG/1
TABLET ORAL
Qty: 270 TABLET | Refills: 2 | Status: SHIPPED | OUTPATIENT
Start: 2019-06-11 | End: 2020-07-10 | Stop reason: SDUPTHER

## 2020-01-28 ENCOUNTER — OFFICE VISIT (OUTPATIENT)
Dept: CARDIOLOGY | Facility: CLINIC | Age: 75
End: 2020-01-28

## 2020-01-28 VITALS
SYSTOLIC BLOOD PRESSURE: 140 MMHG | DIASTOLIC BLOOD PRESSURE: 68 MMHG | HEART RATE: 62 BPM | BODY MASS INDEX: 25.62 KG/M2 | HEIGHT: 69 IN | WEIGHT: 173 LBS

## 2020-01-28 DIAGNOSIS — I10 ESSENTIAL HYPERTENSION: ICD-10-CM

## 2020-01-28 DIAGNOSIS — Z79.899 LONG TERM CURRENT USE OF ANTIARRHYTHMIC DRUG: ICD-10-CM

## 2020-01-28 DIAGNOSIS — E78.2 MIXED HYPERLIPIDEMIA: ICD-10-CM

## 2020-01-28 DIAGNOSIS — Z79.01 CURRENT USE OF LONG TERM ANTICOAGULATION: ICD-10-CM

## 2020-01-28 DIAGNOSIS — I48.0 PAF (PAROXYSMAL ATRIAL FIBRILLATION) (HCC): ICD-10-CM

## 2020-01-28 PROCEDURE — 93000 ELECTROCARDIOGRAM COMPLETE: CPT | Performed by: NURSE PRACTITIONER

## 2020-01-28 PROCEDURE — 99213 OFFICE O/P EST LOW 20 MIN: CPT | Performed by: NURSE PRACTITIONER

## 2020-01-28 NOTE — PROGRESS NOTES
Chief Complaint   Patient presents with   • Follow-up     Cardiac management . No current labs , had done per Rheumatology  last month.  Has no cardiac complaints today. BP slightly elevated today, he reports that he takes 1/2 tablet Valsartan daily.   • Med Refill     Needs refills on all cardiac and cholesterol meds 90 day supply to [facility]       Subjective       Jonn Cespedes is a 74 y.o. male  with hypertension, hypercholesterolemia, and arrhythmias in the form of PAF. He also had short runs of VT and underwent EP study in 2011 but no inducible VT but multiple PAC and AF.  He has done well with Tambocor.  Cardiac workup in 2016 was negative for ischemia and slightly diminished LV function at 46%, home medication was continued. He is taking flecainide 100 mg in the morning and 50 mg in the evening. Occasionally he will take an extra 50 mg at night. His CHADsVASc score is 2. He had reduced Eliquis to 2.5 mg and increased aspirin to 325 mg, but then decreased to 81mg daily since and done well since he increased eliquis to 5mg BID. At his May 2019 office visit the dose of Valsartan was decreased due to lower blood pressure.    Today he comes to the office for a follow-up visit.  He is only in from [place] for a couple of weeks and plans to return.  Per home monitor his blood pressure has remained in normal range.  He continues his normal daily activities including golf most days of the week.  He denies chest pain, palpitations, dizziness, or increased shortness of breath.  According to patient he did have recent labs done per rheumatologist.    HPI     Cardiac History:    Past Surgical History:   Procedure Laterality Date   • CARDIAC CATHETERIZATION  01/30/2012    Cath-Normal Coronaries. EF 45%   • CARDIOVASCULAR STRESS TEST  01/11/2012    Stress-6min, 89%THR, 170-76, EF 46%, Negative   • CARDIOVASCULAR STRESS TEST  08/11/2016    6 min 16 sec, 87% THr, 172/76, no definite ischemia   • CARDIOVASCULAR  STRESS TEST  08/11/2016    EF 46%, no ischemia   • CONVERTED (HISTORICAL) HOLTER  10/25/2011    Holter-freq PAC's and PVC's, freq runs V-tach   • ECHO - CONVERTED  10/28/2011    Echo=EF 50%. RVSP-47mmHg.   • ECHO - CONVERTED  07/23/2013    Echo-EF 60-65%, mild to mod MR.   • ECHO - CONVERTED  08/09/2016    EF 60-65%, mild MR, mild AR, RVSP 30-35 mmHg   • EP STUDY  12/20/2011    EP Study () no inducible V-tach, multifocal PACs and atrial fibrillation       Current Outpatient Medications   Medication Sig Dispense Refill   • apixaban (ELIQUIS) 5 MG tablet tablet Take 1 tablet by mouth Every 12 (Twelve) Hours. 180 tablet 2   • flecainide (TAMBOCOR) 50 MG tablet Takes 2 tablets in am and 1 tablet at night 270 tablet 2   • folic acid (FOLVITE) 1 MG tablet Take 1 mg by mouth Daily.     • methotrexate (RHEUMATREX) 2.5 MG tablet Take 5 tablets by mouth once a week.     • pravastatin (PRAVACHOL) 40 MG tablet Take 1 tablet by mouth Daily. 90 tablet 2   • valsartan (DIOVAN) 80 MG tablet Take 1 tablet by mouth Daily. (Patient taking differently: Take 80 mg by mouth Daily. Takes 1/2 tablet daily) 90 tablet 2   • aspirin 81 MG tablet Take 1 tablet by mouth Daily. 30 tablet 11     No current facility-administered medications for this visit.        Patient has no known allergies.    Past Medical History:   Diagnosis Date   • A-fib (CMS/HCC)    • Deep vein thrombosis (CMS/HCC)    • History of cholecystectomy    • Hypertension    • RA (rheumatoid arthritis) (CMS/HCC)        Social History     Socioeconomic History   • Marital status:      Spouse name: Not on file   • Number of children: Not on file   • Years of education: Not on file   • Highest education level: Not on file   Tobacco Use   • Smoking status: Never Smoker   • Smokeless tobacco: Never Used   Substance and Sexual Activity   • Alcohol use: No   • Drug use: No       Family History   Problem Relation Age of Onset   • No Known Problems Mother        Review  "of Systems   Constitution: Negative for decreased appetite, diaphoresis, fever and malaise/fatigue.   HENT: Negative for congestion and nosebleeds.    Eyes: Negative for redness and visual disturbance.   Cardiovascular: Negative for chest pain, leg swelling, near-syncope and palpitations.   Respiratory: Negative for cough and shortness of breath.    Endocrine: Negative for polydipsia, polyphagia and polyuria.   Hematologic/Lymphatic: Negative for bleeding problem. Does not bruise/bleed easily.   Skin: Positive for color change (Nose area for which he is seen dermatologist and treated.). Negative for poor wound healing and rash.   Musculoskeletal: Positive for arthritis and joint pain (Sometimes right knee but non-concerning at this time). Negative for muscle cramps and muscle weakness.   Gastrointestinal: Negative for abdominal pain, change in bowel habit, dysphagia, melena and nausea.   Genitourinary: Negative for dysuria and hematuria.   Neurological: Negative for dizziness, headaches, light-headedness and loss of balance.   Psychiatric/Behavioral: Negative for memory loss. The patient is not nervous/anxious.    Allergic/Immunologic: Negative for hives and persistent infections.        Objective     /68 (BP Location: Left arm)   Pulse 62   Ht 175.3 cm (69.02\")   Wt 78.5 kg (173 lb)   BMI 25.53 kg/m²     Physical Exam   Constitutional: He is oriented to person, place, and time. He appears well-nourished.   HENT:   Head: Normocephalic.   Eyes: Pupils are equal, round, and reactive to light. Conjunctivae are normal.   Neck: Normal range of motion. Neck supple. Carotid bruit is not present.   Cardiovascular: Normal rate, regular rhythm, S1 normal and S2 normal.   No murmur heard.  Pulses:       Radial pulses are 2+ on the right side, and 2+ on the left side.   Pulmonary/Chest: Effort normal and breath sounds normal.   Abdominal: Soft. Bowel sounds are normal.   Musculoskeletal: Normal range of motion. He " exhibits no edema.   Neurological: He is alert and oriented to person, place, and time.   Skin: Skin is warm and dry. No pallor.   Psychiatric: He has a normal mood and affect. His behavior is normal.          ECG 12 Lead  Date/Time: 1/29/2020 7:46 AM  Performed by: Edwige Damon APRN  Authorized by: Edwige Damon APRN   Comparison: compared with previous ECG from 5/2/2019  Similar to previous ECG  Comparison to previous ECG: NSR  Rhythm: sinus rhythm  Rate: normal  BPM: 62                  Assessment/Plan      Jonn was seen today for follow-up and med refill.    Diagnoses and all orders for this visit:    PAF (paroxysmal atrial fibrillation) (CMS/HCC)    Long term current use of antiarrhythmic drug    Current use of long term anticoagulation    Essential hypertension    Mixed hyperlipidemia    Other orders  -     ECG 12 Lead      PAF/medication management-EKG today shows normal sinus rhythm with normal QTC.  Continue Tambocor at same dose.  For stroke prophylaxis he is on Eliquis without signs of bleeding.  Continue same.    Hypertension-blood pressure remains in normal range.  He will continue to monitor.  At this time continue valsartan at 40 mg daily.  If blood pressure increases can resume higher dose.  He understands to call for any concerns.    Mixed lipidemia- on Pravachol without issue.  He will follow with you for lab orders/management.  Please forward copy of next lab results.    Patient's Body mass index is 25.53 kg/m². BMI is slightly above normal parameters. Recommendations include: nutrition counseling.  His weight is gone up about 12 pounds since last office visit.  BMI slightly increased.  Advised to decrease caloric intake for benefit of weight loss with goal 160-165 pounds.    From a cardiac standpoint Mr. Cespedes appears stable at this time.  No cardiac testing warranted.  A 6-month follow-up visit scheduled.  Please call sooner for any cardiac concerns.           Electronically signed by  Edwige Damon, APRN,  January 29, 2020 7:56 AM

## 2020-07-10 ENCOUNTER — TELEPHONE (OUTPATIENT)
Dept: CARDIOLOGY | Facility: CLINIC | Age: 75
End: 2020-07-10

## 2020-07-10 RX ORDER — FLECAINIDE ACETATE 50 MG/1
TABLET ORAL
Qty: 270 TABLET | Refills: 2 | Status: SHIPPED | OUTPATIENT
Start: 2020-07-10 | End: 2020-08-10 | Stop reason: SDUPTHER

## 2020-08-10 ENCOUNTER — OFFICE VISIT (OUTPATIENT)
Dept: CARDIOLOGY | Facility: CLINIC | Age: 75
End: 2020-08-10

## 2020-08-10 VITALS
SYSTOLIC BLOOD PRESSURE: 120 MMHG | TEMPERATURE: 98.3 F | BODY MASS INDEX: 27.78 KG/M2 | HEIGHT: 69 IN | HEART RATE: 53 BPM | DIASTOLIC BLOOD PRESSURE: 70 MMHG | WEIGHT: 187.6 LBS

## 2020-08-10 DIAGNOSIS — I10 ESSENTIAL HYPERTENSION: Primary | ICD-10-CM

## 2020-08-10 DIAGNOSIS — Z79.899 LONG TERM CURRENT USE OF ANTIARRHYTHMIC DRUG: ICD-10-CM

## 2020-08-10 DIAGNOSIS — I48.0 PAF (PAROXYSMAL ATRIAL FIBRILLATION) (HCC): ICD-10-CM

## 2020-08-10 DIAGNOSIS — E78.2 MIXED HYPERLIPIDEMIA: ICD-10-CM

## 2020-08-10 DIAGNOSIS — Z79.01 CURRENT USE OF LONG TERM ANTICOAGULATION: ICD-10-CM

## 2020-08-10 PROCEDURE — 99213 OFFICE O/P EST LOW 20 MIN: CPT | Performed by: NURSE PRACTITIONER

## 2020-08-10 PROCEDURE — 93000 ELECTROCARDIOGRAM COMPLETE: CPT | Performed by: NURSE PRACTITIONER

## 2020-08-10 RX ORDER — FLECAINIDE ACETATE 50 MG/1
TABLET ORAL
Qty: 270 TABLET | Refills: 3 | Status: SHIPPED | OUTPATIENT
Start: 2020-08-10 | End: 2021-04-20 | Stop reason: SDUPTHER

## 2020-08-10 RX ORDER — VALSARTAN 80 MG/1
80 TABLET ORAL DAILY
Qty: 90 TABLET | Refills: 3 | Status: SHIPPED | OUTPATIENT
Start: 2020-08-10 | End: 2021-04-20

## 2020-08-10 RX ORDER — PRAVASTATIN SODIUM 40 MG
40 TABLET ORAL DAILY
Qty: 90 TABLET | Refills: 3 | Status: SHIPPED | OUTPATIENT
Start: 2020-08-10 | End: 2021-04-20 | Stop reason: SDUPTHER

## 2020-08-10 NOTE — PROGRESS NOTES
"Chief Complaint   Patient presents with   • Follow-up     Cardiac management.   • Lab     Unable to recall last labs, he knows has been over 6 months per PCP.   • Irregular Heart Beat     At times feels like he may have a little pause, states \"it is rare and when I cough it goes away\".   • Med Refill     Needs refills on cardiac medications-90 days.     Subjective       Jonn Cespedes is a 75 y.o. male with hypertension, hypercholesterolemia, and arrhythmias in the form of PAF. He also had short runs of VT and underwent EP study in 2011 but no inducible VT. Multiple PAC and AF managed with flecainide. Cardiac workup in 2016 was negative for ischemia and slightly diminished LV function at 46%. He takes flecainide 100 mg in am and 50 mg in pm with an occasional extra 50 mg at night.  CHADsVASc score is 2. He is managed with Eliquis.  At his May 2019 office visit the dose of Valsartan was decreased due to lower blood pressure.    He returns today for regular follow-up.  He denies any new cardiac symptoms.  No chest pain, shortness of breath, palpitations remain intermittent.  He describes a \"palpitation\" as a short pause after the early beat.  He remains extremely active.  Plays golf 5 to 6 days/week.  He spends winter in [place].  He plans to see Dr. Weiss soon for repeat labs.  Refills requested.         Cardiac History:    Past Surgical History:   Procedure Laterality Date   • CARDIAC CATHETERIZATION  01/30/2012    Cath-Normal Coronaries. EF 45%   • CARDIOVASCULAR STRESS TEST  01/11/2012    Stress-6min, 89%THR, 170-76, EF 46%, Negative   • CARDIOVASCULAR STRESS TEST  08/11/2016    6 min 16 sec, 87% THr, 172/76, no definite ischemia   • CARDIOVASCULAR STRESS TEST  08/11/2016    EF 46%, no ischemia   • CONVERTED (HISTORICAL) HOLTER  10/25/2011    Holter-freq PAC's and PVC's, freq runs V-tach   • ECHO - CONVERTED  10/28/2011    Echo=EF 50%. RVSP-47mmHg.   • ECHO - CONVERTED  07/23/2013    Echo-EF 60-65%, mild to mod "    • ECHO - CONVERTED  08/09/2016    EF 60-65%, mild MR, mild AR, RVSP 30-35 mmHg   • EP STUDY  12/20/2011    EP Study () no inducible V-tach, multifocal PACs and atrial fibrillation     Current Outpatient Medications   Medication Sig Dispense Refill   • apixaban (ELIQUIS) 5 MG tablet tablet Take 1 tablet by mouth Every 12 (Twelve) Hours. 180 tablet 3   • flecainide (TAMBOCOR) 50 MG tablet Takes 2 tablets in am and 1 tablet at night 270 tablet 3   • folic acid (FOLVITE) 1 MG tablet Take 1 mg by mouth Daily.     • methotrexate (RHEUMATREX) 2.5 MG tablet Take 5 tablets by mouth once a week.     • pravastatin (PRAVACHOL) 40 MG tablet Take 1 tablet by mouth Daily. 90 tablet 3   • valsartan (DIOVAN) 80 MG tablet Take 1 tablet by mouth Daily. 90 tablet 3     No current facility-administered medications for this visit.      Patient has no known allergies.    Past Medical History:   Diagnosis Date   • A-fib (CMS/HCC)    • Deep vein thrombosis (CMS/HCC)    • History of cholecystectomy    • Hypertension    • RA (rheumatoid arthritis) (CMS/HCC)      Social History     Socioeconomic History   • Marital status:      Spouse name: Not on file   • Number of children: Not on file   • Years of education: Not on file   • Highest education level: Not on file   Tobacco Use   • Smoking status: Never Smoker   • Smokeless tobacco: Never Used   Substance and Sexual Activity   • Alcohol use: Yes     Comment: rarely   • Drug use: No     Family History   Problem Relation Age of Onset   • No Known Problems Mother      Review of Systems   Constitution: Negative for decreased appetite and malaise/fatigue.   HENT: Negative.    Eyes: Negative for blurred vision.   Cardiovascular: Negative for chest pain, dyspnea on exertion, leg swelling, palpitations and syncope.   Respiratory: Negative for shortness of breath and sleep disturbances due to breathing.    Endocrine: Negative.    Hematologic/Lymphatic: Negative for bleeding  "problem. Does not bruise/bleed easily.   Skin: Negative.    Musculoskeletal: Negative for falls and myalgias.   Gastrointestinal: Negative for abdominal pain, heartburn and melena.   Genitourinary: Negative for hematuria.   Neurological: Negative for dizziness and light-headedness.   Psychiatric/Behavioral: Negative for altered mental status.   Allergic/Immunologic: Negative.       Objective     /70 (BP Location: Left arm)   Pulse 53   Temp 98.3 °F (36.8 °C)   Ht 175.3 cm (69.02\")   Wt 85.1 kg (187 lb 9.6 oz)   BMI 27.69 kg/m²     Physical Exam   Constitutional: He is oriented to person, place, and time. He appears well-developed and well-nourished. No distress.   HENT:   Head: Normocephalic.   Eyes: Pupils are equal, round, and reactive to light.   Neck: Normal range of motion.   Cardiovascular: Regular rhythm, S1 normal, S2 normal and intact distal pulses. Bradycardia present.   No murmur heard.  Pulmonary/Chest: Effort normal and breath sounds normal. No respiratory distress.   Abdominal: Soft. Bowel sounds are normal.   Musculoskeletal: Normal range of motion. He exhibits no edema.   Neurological: He is alert and oriented to person, place, and time.   Skin: Skin is warm and dry. He is not diaphoretic.   Psychiatric: He has a normal mood and affect.   Nursing note and vitals reviewed.       ECG 12 Lead  Date/Time: 8/10/2020 10:50 AM  Performed by: Michelle Kuo APRN  Authorized by: Michelle Kuo APRN   Comparison: compared with previous ECG from 1/28/2020  Similar to previous ECG  Comparison to previous ECG: Slightly more bradycardic  Rhythm: sinus rhythm and sinus bradycardia  Rate: bradycardic  BPM: 53  ST Segments: ST segments normal    Clinical impression: non-specific ECG  Comments:  ms  QTc 420 ms                Problem List Items Addressed This Visit        Cardiovascular and Mediastinum    PAF (paroxysmal atrial fibrillation) (CMS/HCC)    HTN (hypertension) - Primary    Relevant " Medications    valsartan (DIOVAN) 80 MG tablet    Hyperlipemia    Relevant Medications    pravastatin (PRAVACHOL) 40 MG tablet       Other    Current use of long term anticoagulation    Long term current use of antiarrhythmic drug        1.  PAF- EKG today showed sinus bradycardia at 53 bpm, normal AR and QTc intervals.  Continue flecainide at the same dose.  He is anticoagulated with Eliquis without bruising or bleeding.  He had some questions about aspirin 81 mg.  He had stopped taking.  Without symptoms of bleeding or bruising and risk factors of hypertension and hyperlipidemia, he is advised to take low-dose aspirin.    2.  Hypertension- Very well controlled with low-dose valsartan.  Limit sodium to 1500 mg daily.  Weight management for BMI closer to 25.    3.  Hyperlipidemia- continue management with pravastatin as he tolerates well.  Labs he prefers to be done at Dr. Weiss's office.  Could we get copy forwarded when available?    He appears stable at this time.  No changes made today.  Follow-up in 6 months.    Patient's Body mass index is 27.69 kg/m². BMI is above normal parameters. Recommendations include: nutrition counseling.                 Electronically signed by MARINO Jimenes,  August 10, 2020 10:55

## 2021-04-20 ENCOUNTER — OFFICE VISIT (OUTPATIENT)
Dept: CARDIOLOGY | Facility: CLINIC | Age: 76
End: 2021-04-20

## 2021-04-20 VITALS
BODY MASS INDEX: 28.56 KG/M2 | DIASTOLIC BLOOD PRESSURE: 70 MMHG | TEMPERATURE: 98.4 F | SYSTOLIC BLOOD PRESSURE: 128 MMHG | HEART RATE: 59 BPM | WEIGHT: 192.8 LBS | HEIGHT: 69 IN

## 2021-04-20 DIAGNOSIS — Z79.01 CURRENT USE OF LONG TERM ANTICOAGULATION: ICD-10-CM

## 2021-04-20 DIAGNOSIS — I48.0 PAF (PAROXYSMAL ATRIAL FIBRILLATION) (HCC): ICD-10-CM

## 2021-04-20 DIAGNOSIS — I10 ESSENTIAL HYPERTENSION: Primary | ICD-10-CM

## 2021-04-20 DIAGNOSIS — E78.2 MIXED HYPERLIPIDEMIA: ICD-10-CM

## 2021-04-20 DIAGNOSIS — Z79.899 LONG TERM CURRENT USE OF ANTIARRHYTHMIC DRUG: ICD-10-CM

## 2021-04-20 PROCEDURE — 99214 OFFICE O/P EST MOD 30 MIN: CPT | Performed by: NURSE PRACTITIONER

## 2021-04-20 PROCEDURE — 93000 ELECTROCARDIOGRAM COMPLETE: CPT | Performed by: NURSE PRACTITIONER

## 2021-04-20 RX ORDER — VALSARTAN 160 MG/1
80 TABLET ORAL DAILY
Qty: 90 TABLET | Refills: 3 | Status: SHIPPED | OUTPATIENT
Start: 2021-04-20 | End: 2021-11-04 | Stop reason: SDUPTHER

## 2021-04-20 RX ORDER — VALSARTAN 160 MG/1
80 TABLET ORAL DAILY
COMMUNITY
End: 2021-04-20 | Stop reason: SDUPTHER

## 2021-04-20 RX ORDER — PRAVASTATIN SODIUM 40 MG
40 TABLET ORAL DAILY
Qty: 90 TABLET | Refills: 3 | Status: SHIPPED | OUTPATIENT
Start: 2021-04-20 | End: 2021-11-04 | Stop reason: SDUPTHER

## 2021-04-20 RX ORDER — FLECAINIDE ACETATE 50 MG/1
TABLET ORAL
Qty: 270 TABLET | Refills: 3 | Status: SHIPPED | OUTPATIENT
Start: 2021-04-20 | End: 2021-11-04 | Stop reason: SDUPTHER

## 2021-04-20 NOTE — PROGRESS NOTES
Chief Complaint   Patient presents with   • Follow-up     Cardiac management   • Lab     Last labs in chart.   • Dizziness     Has occasional, relates to vertigo.   • Shortness of Breath     Relates to increase in weight.   • Med Refill     Needs refills on cardiac medications-90 day. Patient went over verbally.     Subjective       Jonn Cespedes is a 76 y.o. male with hypertension, hypercholesterolemia, and arrhythmias in the form of PAF. He also had short runs of VT and underwent EP study in 2011 but no inducible VT. Multiple PAC and AF managed with flecainide. Cardiac workup in 2016 was negative for ischemia and slightly diminished LV function at 46%. He takes flecainide 100 mg in am and 50 mg in pm with an occasional extra 50 mg at night.  CHADsVASc score is 2. He is managed with Eliquis.  At his May 2019 office visit the dose of Valsartan was decreased due to lower blood pressure.    He returns today for regular follow up. He denies new or worsening cardiac symptoms.  No chest pain, shortness of breath, palpitations remain unchanged, not frequent. Occasional early beat followed by pause. He spends winter in [place], plays golf most days of the week. Labs 8/26/2020 showed glucose 96, BUN/CR 25/1.19, GFR 60, normal electrolytes, normal LFT, , TRI 49, HDL 66, LDL 88, TSH 3.07, normal CBC, normal CRP 1.68.         Cardiac History:    Past Surgical History:   Procedure Laterality Date   • CARDIAC CATHETERIZATION  01/30/2012    Cath-Normal Coronaries. EF 45%   • CARDIOVASCULAR STRESS TEST  01/11/2012    Stress-6min, 89%THR, 170-76, EF 46%, Negative   • CARDIOVASCULAR STRESS TEST  08/11/2016    6 min 16 sec, 87% THr, 172/76, no definite ischemia   • CARDIOVASCULAR STRESS TEST  08/11/2016    EF 46%, no ischemia   • CONVERTED (HISTORICAL) HOLTER  10/25/2011    Holter-freq PAC's and PVC's, freq runs V-tach   • ECHO - CONVERTED  10/28/2011    Echo=EF 50%. RVSP-47mmHg.   • ECHO - CONVERTED  07/23/2013    Echo-EF  60-65%, mild to mod MR.   • ECHO - CONVERTED  08/09/2016    EF 60-65%, mild MR, mild AR, RVSP 30-35 mmHg   • EP STUDY  12/20/2011    EP Study () no inducible V-tach, multifocal PACs and atrial fibrillation     Current Outpatient Medications   Medication Sig Dispense Refill   • apixaban (ELIQUIS) 5 MG tablet tablet Take 1 tablet by mouth Every 12 (Twelve) Hours. 180 tablet 3   • flecainide (TAMBOCOR) 50 MG tablet Takes 2 tablets in am and 1 tablet at night 270 tablet 3   • folic acid (FOLVITE) 1 MG tablet Take 1 mg by mouth Daily.     • methotrexate (RHEUMATREX) 2.5 MG tablet Take 5 tablets by mouth once a week.     • pravastatin (PRAVACHOL) 40 MG tablet Take 1 tablet by mouth Daily. 90 tablet 3   • valsartan (DIOVAN) 160 MG tablet Take 0.5 tablets by mouth Daily. 90 tablet 3     No current facility-administered medications for this visit.     Patient has no known allergies.    Past Medical History:   Diagnosis Date   • A-fib (CMS/HCC)    • Deep vein thrombosis (CMS/HCC)    • History of cholecystectomy    • Hypertension    • RA (rheumatoid arthritis) (CMS/HCC)      Social History     Socioeconomic History   • Marital status:      Spouse name: Not on file   • Number of children: Not on file   • Years of education: Not on file   • Highest education level: Not on file   Tobacco Use   • Smoking status: Never Smoker   • Smokeless tobacco: Never Used   Vaping Use   • Vaping Use: Never used   Substance and Sexual Activity   • Alcohol use: Yes     Comment: rarely   • Drug use: No     Family History   Problem Relation Age of Onset   • No Known Problems Mother      Review of Systems   Constitutional: Positive for weight gain (Up 5 pounds). Negative for decreased appetite and malaise/fatigue.   HENT: Negative.    Eyes: Negative for blurred vision.   Cardiovascular: Positive for palpitations. Negative for chest pain, dyspnea on exertion, leg swelling and syncope.   Respiratory: Negative for shortness of breath  "and sleep disturbances due to breathing.    Endocrine: Negative.    Hematologic/Lymphatic: Negative for bleeding problem. Does not bruise/bleed easily.   Skin: Negative.    Musculoskeletal: Negative for falls and myalgias.   Gastrointestinal: Negative for abdominal pain, heartburn and melena.   Genitourinary: Negative for hematuria.   Neurological: Negative for dizziness and light-headedness.   Psychiatric/Behavioral: Negative for altered mental status.   Allergic/Immunologic: Negative.       Objective     /70 (BP Location: Left arm)   Pulse 59   Temp 98.4 °F (36.9 °C)   Ht 175.3 cm (69.02\")   Wt 87.5 kg (192 lb 12.8 oz)   BMI 28.46 kg/m²     Vitals and nursing note reviewed.   Constitutional:       General: Not in acute distress.     Appearance: Well-developed. Not diaphoretic.   Eyes:      Pupils: Pupils are equal, round, and reactive to light.   HENT:      Head: Normocephalic.   Pulmonary:      Effort: Pulmonary effort is normal. No respiratory distress.      Breath sounds: Normal breath sounds.   Cardiovascular:      Normal rate. Regular rhythm.   Pulses:     Intact distal pulses.   Edema:     Peripheral edema absent.   Abdominal:      General: Bowel sounds are normal.      Palpations: Abdomen is soft.   Musculoskeletal: Normal range of motion.      Cervical back: Normal range of motion. Skin:     General: Skin is warm and dry.   Neurological:      Mental Status: Alert and oriented to person, place, and time.          ECG 12 Lead    Date/Time: 4/20/2021 10:07 AM  Performed by: Michelle Kuo APRN  Authorized by: Michelle Kuo APRN   Comparison: compared with previous ECG from 8/10/2020  Similar to previous ECG  Comparison to previous ECG: Rate improved from 53 to 59   Rhythm: sinus rhythm and sinus bradycardia  Rate: bradycardic  BPM: 59  ST Segments: ST segments normal    Clinical impression: non-specific ECG  Comments: QTc 431 ms                Problem List Items Addressed This Visit        Cardiac " and Vasculature    PAF (paroxysmal atrial fibrillation) (CMS/[facility])    Relevant Orders    ECG 12 Lead    HTN (hypertension) - Primary    Relevant Medications    valsartan (DIOVAN) 160 MG tablet    Hyperlipemia    Relevant Medications    pravastatin (PRAVACHOL) 40 MG tablet    Long term current use of antiarrhythmic drug    Relevant Orders    ECG 12 Lead       Coag and Thromboembolic    Current use of long term anticoagulation         1.  PAF- EKG today showed sinus bradycardia at 59 bpm, normal NV and QTc intervals.  Continue flecainide. Continue Eliquis for CVA prophylaxis.     2.  Hypertension- Very well controlled with low-dose valsartan.  Limit sodium to 1500 mg daily.  Weight management for BMI closer to 25.     3.  Hyperlipidemia- continue management with pravastatin as he tolerates well.  Labs per Dr. Weiss reviewed with him. Lipids well controlled.      He appears stable at this time.  No changes made today.  His weight has increased some. He is encouraged to follow stricter diet. He is going to reduce sugar intake. Follow-up in 6 months.    Patient's Body mass index is 28.46 kg/m². BMI is above normal parameters. Recommendations include: nutrition counseling.               Electronically signed by MARINO Jimenes,  April 20, 2021 10:10 EDT

## 2021-11-04 ENCOUNTER — OFFICE VISIT (OUTPATIENT)
Dept: CARDIOLOGY | Facility: CLINIC | Age: 76
End: 2021-11-04

## 2021-11-04 VITALS
HEART RATE: 63 BPM | DIASTOLIC BLOOD PRESSURE: 72 MMHG | TEMPERATURE: 97.5 F | BODY MASS INDEX: 29.71 KG/M2 | HEIGHT: 69 IN | SYSTOLIC BLOOD PRESSURE: 122 MMHG | WEIGHT: 200.6 LBS

## 2021-11-04 DIAGNOSIS — I10 PRIMARY HYPERTENSION: ICD-10-CM

## 2021-11-04 DIAGNOSIS — Z79.899 LONG TERM CURRENT USE OF ANTIARRHYTHMIC DRUG: ICD-10-CM

## 2021-11-04 DIAGNOSIS — Z79.01 CURRENT USE OF LONG TERM ANTICOAGULATION: ICD-10-CM

## 2021-11-04 DIAGNOSIS — I48.0 PAF (PAROXYSMAL ATRIAL FIBRILLATION) (HCC): Primary | ICD-10-CM

## 2021-11-04 DIAGNOSIS — E78.2 MIXED HYPERLIPIDEMIA: ICD-10-CM

## 2021-11-04 PROCEDURE — 93000 ELECTROCARDIOGRAM COMPLETE: CPT | Performed by: NURSE PRACTITIONER

## 2021-11-04 PROCEDURE — 99214 OFFICE O/P EST MOD 30 MIN: CPT | Performed by: NURSE PRACTITIONER

## 2021-11-04 RX ORDER — PRAVASTATIN SODIUM 40 MG
40 TABLET ORAL DAILY
Qty: 90 TABLET | Refills: 3 | Status: SHIPPED | OUTPATIENT
Start: 2021-11-04 | End: 2022-10-19 | Stop reason: SDUPTHER

## 2021-11-04 RX ORDER — VALSARTAN 160 MG/1
80 TABLET ORAL DAILY
Qty: 90 TABLET | Refills: 3 | Status: SHIPPED | OUTPATIENT
Start: 2021-11-04 | End: 2022-10-19 | Stop reason: SDUPTHER

## 2021-11-04 RX ORDER — FLECAINIDE ACETATE 50 MG/1
TABLET ORAL
Qty: 270 TABLET | Refills: 3 | Status: SHIPPED | OUTPATIENT
Start: 2021-11-04 | End: 2022-10-19 | Stop reason: SDUPTHER

## 2021-11-04 NOTE — PROGRESS NOTES
Chief Complaint   Patient presents with   • Follow-up     Cardiac management   • Lab     Last labs in chart.   • Palpitations     Rarely has any fluttering.   • Shortness of Breath     Relates to weight.   • Med Refill     Needs refills on cardiac medications-90 day.     Subjective       Jonn Cespedes is a 76 y.o. male with hypertension, hypercholesterolemia, and arrhythmias in the form of PAF. He also had short runs of VT and underwent EP study in 2011 but no inducible VT. Multiple PAC and AF managed with flecainide. Cardiac workup in 2016 was negative for ischemia and slightly diminished LV function at 46%. He takes flecainide 100 mg in am and 50 mg in pm with an occasional extra 50 mg at night.  CHADsVASc score is 2. He is managed with Eliquis.  At his May 2019 office visit the dose of Valsartan was decreased due to lower blood pressure.    He returns today for follow-up.  Denies cardiac symptoms.  No CP, SOB, palpitations remain infrequent.  He occasionally takes extra dose of flecainide.  He has plantar fasciitis, not walking as much.  Weight is up 8 pounds.  He has occasional cough in a.m., notices a wheeze with lying down at times.  He is planning to soon go to [place] for the winter.  No bruising or bleeding.  Last labs 8/2020 showed normal CMP, CBC.  , TRI 49, HDL 66, LDL 88.  He plans to see Dr. Weiss before leaving.         Cardiac History:    Past Surgical History:   Procedure Laterality Date   • CARDIAC CATHETERIZATION  01/30/2012    Cath-Normal Coronaries. EF 45%   • CARDIOVASCULAR STRESS TEST  01/11/2012    Stress-6min, 89%THR, 170-76, EF 46%, Negative   • CARDIOVASCULAR STRESS TEST  08/11/2016    6 min 16 sec, 87% THr, 172/76, no definite ischemia   • CARDIOVASCULAR STRESS TEST  08/11/2016    EF 46%, no ischemia   • CONVERTED (HISTORICAL) HOLTER  10/25/2011    Holter-freq PAC's and PVC's, freq runs V-tach   • ECHO - CONVERTED  10/28/2011    Echo=EF 50%. RVSP-47mmHg.   • ECHO - CONVERTED   07/23/2013    Echo-EF 60-65%, mild to mod MR.   • ECHO - CONVERTED  08/09/2016    EF 60-65%, mild MR, mild AR, RVSP 30-35 mmHg   • EP STUDY  12/20/2011    EP Study () no inducible V-tach, multifocal PACs and atrial fibrillation     Current Outpatient Medications   Medication Sig Dispense Refill   • apixaban (ELIQUIS) 5 MG tablet tablet Take 1 tablet by mouth Every 12 (Twelve) Hours. 180 tablet 3   • flecainide (TAMBOCOR) 50 MG tablet Takes 2 tablets in am and 1 tablet at night 270 tablet 3   • folic acid (FOLVITE) 1 MG tablet Take 1 mg by mouth Daily.     • methotrexate (RHEUMATREX) 2.5 MG tablet Take 5 tablets by mouth once a week.     • pravastatin (PRAVACHOL) 40 MG tablet Take 1 tablet by mouth Daily. 90 tablet 3   • valsartan (DIOVAN) 160 MG tablet Take 0.5 tablets by mouth Daily. 90 tablet 3     No current facility-administered medications for this visit.     Patient has no known allergies.    Past Medical History:   Diagnosis Date   • A-fib (HCC)    • Deep vein thrombosis (HCC)    • History of cholecystectomy    • Hypertension    • RA (rheumatoid arthritis) (HCC)      Social History     Socioeconomic History   • Marital status:    Tobacco Use   • Smoking status: Never Smoker   • Smokeless tobacco: Never Used   Vaping Use   • Vaping Use: Never used   Substance and Sexual Activity   • Alcohol use: Yes     Comment: rarely   • Drug use: No     Family History   Problem Relation Age of Onset   • No Known Problems Mother      Review of Systems   Constitutional: Positive for weight gain (Up 8 pounds). Negative for decreased appetite and malaise/fatigue.   HENT: Negative.    Eyes: Negative for blurred vision.   Cardiovascular: Positive for palpitations (Infrequent). Negative for chest pain, dyspnea on exertion, leg swelling and syncope.   Respiratory: Negative for shortness of breath and sleep disturbances due to breathing.    Endocrine: Negative.    Hematologic/Lymphatic: Negative for bleeding  "problem. Does not bruise/bleed easily.   Skin: Negative.    Musculoskeletal: Negative for falls and myalgias.   Gastrointestinal: Negative for abdominal pain, heartburn and melena.   Genitourinary: Negative for hematuria.   Neurological: Negative for dizziness and light-headedness.   Psychiatric/Behavioral: Negative for altered mental status.   Allergic/Immunologic: Negative.       Objective     /72 (BP Location: Right arm)   Pulse 63   Temp 97.5 °F (36.4 °C)   Ht 175.3 cm (69.02\")   Wt 91 kg (200 lb 9.6 oz)   BMI 29.61 kg/m²     Vitals and nursing note reviewed.   Constitutional:       General: Not in acute distress.     Appearance: Well-developed. Not diaphoretic.   Eyes:      Pupils: Pupils are equal, round, and reactive to light.   HENT:      Head: Normocephalic.   Pulmonary:      Effort: Pulmonary effort is normal. No respiratory distress.      Breath sounds: Normal breath sounds.   Cardiovascular:      Normal rate. Regular rhythm.   Pulses:     Intact distal pulses.   Edema:     Peripheral edema absent.   Abdominal:      General: Bowel sounds are normal.      Palpations: Abdomen is soft.   Musculoskeletal: Normal range of motion.      Cervical back: Normal range of motion. Skin:     General: Skin is warm and dry.   Neurological:      Mental Status: Alert and oriented to person, place, and time.          ECG 12 Lead    Date/Time: 11/4/2021 9:08 AM  Performed by: Michelle Kuo APRN  Authorized by: Michelle Kuo APRN   Comparison: compared with previous ECG from 4/20/2021  Similar to previous ECG  Rhythm: sinus rhythm  Rate: normal  BPM: 63  ST Segments: ST segments normal    Clinical impression: normal ECG  Comments: QTc 425 ms                Problem List Items Addressed This Visit        Cardiac and Vasculature    PAF (paroxysmal atrial fibrillation) (HCC) - Primary    HTN (hypertension)    Relevant Medications    valsartan (DIOVAN) 160 MG tablet    Hyperlipemia    Relevant Medications    pravastatin " (PRAVACHOL) 40 MG tablet    Long term current use of antiarrhythmic drug       Coag and Thromboembolic    Current use of long term anticoagulation         1.  PAF-managed with flecainide, Eliquis.  EKG showed NSR at 63 bpm, normal QT interval.  Continue same plan.    2.  HTN-very well controlled with valsartan.  Continue low-sodium diet.    3.  Hyperlipidemia-LDL 88 on pravastatin.  Normal coronaries in the past.  Continue heart healthy diet.  Mediterranean diet sheet printed.    4.  Mild, intermittent cough, occasional wheeze-lungs clear on today's exam.  Can try Flonase for sinus drainage.  Questionable aspiration secondary to GERD but patient does not feel this is a problem.  Advised to avoid late night eating.      He plans to see Dr. Weiss for labs prior to leaving for [place].  Refill sent.  Follow-up in 6 months or sooner if needed.  Patient's Body mass index is 29.61 kg/m². indicating that he is overweight (BMI 25-29.9). Obesity-related health conditions include the following: obstructive sleep apnea, hypertension, coronary heart disease, diabetes mellitus and dyslipidemias. Obesity is worsening. BMI is is above average; BMI management plan is completed. We discussed portion control and increasing exercise..              Electronically signed by MARINO Jimenes,  November 4, 2021 09:15 EDT

## 2021-11-04 NOTE — PATIENT INSTRUCTIONS
Mediterranean Diet  A Mediterranean diet refers to food and lifestyle choices that are based on the traditions of countries located on the [place]. This way of eating has been shown to help prevent certain conditions and improve outcomes for people who have chronic diseases, like kidney disease and heart disease.  What are tips for following this plan?  Lifestyle  · Cook and eat meals together with your family, when possible.  · Drink enough fluid to keep your urine clear or pale yellow.  · Be physically active every day. This includes:  ? Aerobic exercise like running or swimming.  ? Leisure activities like gardening, walking, or housework.  · Get 7-8 hours of sleep each night.  · If recommended by your health care provider, drink red wine in moderation. This means 1 glass a day for nonpregnant women and 2 glasses a day for men. A glass of wine equals 5 oz (150 mL).  Reading food labels    · Check the serving size of packaged foods. For foods such as rice and pasta, the serving size refers to the amount of cooked product, not dry.  · Check the total fat in packaged foods. Avoid foods that have saturated fat or trans fats.  · Check the ingredients list for added sugars, such as corn syrup.    Shopping  · At the grocery store, buy most of your food from the areas near the walls of the store. This includes:  ? Fresh fruits and vegetables (produce).  ? Grains, beans, nuts, and seeds. Some of these may be available in unpackaged forms or large amounts (in bulk).  ? Fresh seafood.  ? Poultry and eggs.  ? Low-fat dairy products.  · Buy whole ingredients instead of prepackaged foods.  · Buy fresh fruits and vegetables in-season from local farmers markets.  · Buy frozen fruits and vegetables in resealable bags.  · If you do not have access to quality fresh seafood, buy precooked frozen shrimp or canned fish, such as tuna, salmon, or sardines.  · Buy small amounts of raw or cooked vegetables, salads, or olives from  the deli or salad bar at your store.  · Stock your pantry so you always have certain foods on hand, such as olive oil, canned tuna, canned tomatoes, rice, pasta, and beans.  Cooking  · Cook foods with extra-virgin olive oil instead of using butter or other vegetable oils.  · Have meat as a side dish, and have vegetables or grains as your main dish. This means having meat in small portions or adding small amounts of meat to foods like pasta or stew.  · Use beans or vegetables instead of meat in common dishes like chili or lasagna.  · [place] with different cooking methods. Try roasting or broiling vegetables instead of steaming or sautéeing them.  · Add frozen vegetables to soups, stews, pasta, or rice.  · Add nuts or seeds for added healthy fat at each meal. You can add these to yogurt, salads, or vegetable dishes.  · Marinate fish or vegetables using olive oil, lemon juice, garlic, and fresh herbs.  Meal planning    · Plan to eat 1 vegetarian meal one day each week. Try to work up to 2 vegetarian meals, if possible.  · Eat seafood 2 or more times a week.  · Have healthy snacks readily available, such as:  ? Vegetable sticks with hummus.  ? Greek yogurt.  ? Fruit and nut trail mix.  · Eat balanced meals throughout the week. This includes:  ? Fruit: 2-3 servings a day  ? Vegetables: 4-5 servings a day  ? Low-fat dairy: 2 servings a day  ? Fish, poultry, or lean meat: 1 serving a day  ? Beans and legumes: 2 or more servings a week  ? Nuts and seeds: 1-2 servings a day  ? Whole grains: 6-8 servings a day  ? Extra-virgin olive oil: 3-4 servings a day  · Limit red meat and sweets to only a few servings a month    What are my food choices?  · Mediterranean diet  ? Recommended  § Grains: Whole-grain pasta. Brown rice. Bulgar wheat. Polenta. Couscous. Whole-wheat bread. Oatmeal. Quinoa.  § Vegetables: Artichokes. Beets. Broccoli. Cabbage. Carrots. Eggplant. Green beans. Chard. Kale. Spinach. Onions. Leeks. Peas. Squash.  Tomatoes. Peppers. Radishes.  § Fruits: Apples. Apricots. Avocado. Berries. Bananas. Cherries. Dates. Figs. Grapes. Aron. Melon. Oranges. Peaches. Plums. Pomegranate.  § Meats and other protein foods: Beans. Almonds. Sunflower seeds. Pine nuts. Peanuts. Cod. [place]. Scallops. Shrimp. Tuna. Tilapia. Clams. Oysters. Eggs.  § Dairy: Low-fat milk. Cheese. Greek yogurt.  § Beverages: Water. Red wine. Herbal tea.  § Fats and oils: Extra virgin olive oil. Avocado oil. Grape seed oil.  § Sweets and desserts: Greek yogurt with honey. Baked apples. Poached pears. Trail mix.  § Seasoning and other foods: Basil. Cilantro. Coriander. Cumin. Mint. Parsley. Marcial. Rosemary. Tarragon. Garlic. Oregano. Thyme. Pepper. Balsalmic vinegar. Tahini. Hummus. Tomato sauce. Olives. Mushrooms.  ? Limit these  § Grains: Prepackaged pasta or rice dishes. Prepackaged cereal with added sugar.  § Vegetables: Deep fried potatoes (french fries).  § Fruits: Fruit canned in syrup.  § Meats and other protein foods: Beef. Pork. Lamb. Poultry with skin. Hot dogs. [place]elez.  § Dairy: Ice cream. Sour cream. Whole milk.  § Beverages: Juice. Sugar-sweetened soft drinks. Beer. Liquor and spirits.  § Fats and oils: Butter. Canola oil. Vegetable oil. Beef fat (tallow). Lard.  § Sweets and desserts: Cookies. Cakes. Pies. Candy.  § Seasoning and other foods: Mayonnaise. Premade sauces and marinades.  The items listed may not be a complete list. Talk with your dietitian about what dietary choices are right for you.  Summary  · The Mediterranean diet includes both food and lifestyle choices.  · Eat a variety of fresh fruits and vegetables, beans, nuts, seeds, and whole grains.  · Limit the amount of red meat and sweets that you eat.  · Talk with your health care provider about whether it is safe for you to drink red wine in moderation. This means 1 glass a day for nonpregnant women and 2 glasses a day for men. A glass of wine equals 5 oz (150 mL).  This information  is not intended to replace advice given to you by your health care provider. Make sure you discuss any questions you have with your health care provider.  Document Revised: 08/17/2017 Document Reviewed: 08/10/2017  Elsevier Patient Education © 2020 [employer]

## 2022-07-12 ENCOUNTER — TELEPHONE (OUTPATIENT)
Dept: CARDIOLOGY | Facility: CLINIC | Age: 77
End: 2022-07-12

## 2022-07-12 NOTE — TELEPHONE ENCOUNTER
Dr Patricia Ko called. Pt is needing a tooth extraction. She is asking for clearance for pt to hold his Eliquis and for your recommendations on how long he could safely hold it.    History of PAF (managed with flecainide) and history of DVT( exact date unknown, not recent).

## 2022-10-19 ENCOUNTER — OFFICE VISIT (OUTPATIENT)
Dept: CARDIOLOGY | Facility: CLINIC | Age: 77
End: 2022-10-19

## 2022-10-19 VITALS
WEIGHT: 195.8 LBS | BODY MASS INDEX: 29 KG/M2 | SYSTOLIC BLOOD PRESSURE: 128 MMHG | DIASTOLIC BLOOD PRESSURE: 70 MMHG | HEART RATE: 62 BPM | HEIGHT: 69 IN

## 2022-10-19 DIAGNOSIS — I48.0 PAF (PAROXYSMAL ATRIAL FIBRILLATION): Primary | ICD-10-CM

## 2022-10-19 DIAGNOSIS — Z79.899 LONG TERM CURRENT USE OF ANTIARRHYTHMIC DRUG: ICD-10-CM

## 2022-10-19 PROCEDURE — 93000 ELECTROCARDIOGRAM COMPLETE: CPT | Performed by: NURSE PRACTITIONER

## 2022-10-19 PROCEDURE — 99213 OFFICE O/P EST LOW 20 MIN: CPT | Performed by: NURSE PRACTITIONER

## 2022-10-19 RX ORDER — PRAVASTATIN SODIUM 40 MG
40 TABLET ORAL DAILY
Qty: 90 TABLET | Refills: 3 | Status: SHIPPED | OUTPATIENT
Start: 2022-10-19

## 2022-10-19 RX ORDER — FLECAINIDE ACETATE 50 MG/1
TABLET ORAL
Qty: 270 TABLET | Refills: 3 | Status: SHIPPED | OUTPATIENT
Start: 2022-10-19

## 2022-10-19 RX ORDER — VALSARTAN 160 MG/1
80 TABLET ORAL DAILY
Qty: 90 TABLET | Refills: 3 | Status: SHIPPED | OUTPATIENT
Start: 2022-10-19

## 2022-10-19 NOTE — PROGRESS NOTES
"Chief Complaint   Patient presents with   • Follow-up     Cardiac management   • Lab     To have labs next week per PCP. Had skin cancer from face and left ear.   • PAF     States \"I feel an occasional arrhythmia, it is rare\".    • Med Refill     Needs refills on cardiac medications-90 day.     Subjective       Jonn Cespedes is a 77 y.o. male with hypertension, hypercholesterolemia and  PAF. He also had short runs of VT and underwent EP study in 2011 but no inducible VT. Multiple PAC and AF managed with flecainide. Cardiac workup in 2016 was negative for ischemia and slightly diminished LV function at 46%. He takes flecainide 100 mg in am and 50 mg in pm with an occasional extra 50 mg at night.  CHADsVASc score is 2. He is managed with Eliquis.  At his May 2019 office visit the dose of Valsartan was decreased due to hypotension.      He returns today for follow-up.  Denies cardiac symptoms.  No CP, SOB, palpitations remain infrequent.  He occasionally takes extra dose of flecainide. Had skin cancers removed from face and ear.  He remain active, golfing frequently. Planning to spend winter in [place]. No bruising or bleeding.  Plans to have labs with Dr. Weiss before leaving. In 8/2020, normal CMP, CBC.  , TRI 49, HDL 66, LDL 88.         Cardiac History:    Past Surgical History:   Procedure Laterality Date   • CARDIAC CATHETERIZATION  01/30/2012    Cath-Normal Coronaries. EF 45%   • CARDIOVASCULAR STRESS TEST  01/11/2012    Stress-6min, 89%THR, 170-76, EF 46%, Negative   • CARDIOVASCULAR STRESS TEST  08/11/2016    6 min 16 sec, 87% THr, 172/76, no definite ischemia   • CARDIOVASCULAR STRESS TEST  08/11/2016    EF 46%, no ischemia   • CONVERTED (HISTORICAL) HOLTER  10/25/2011    Holter-freq PAC's and PVC's, freq runs V-tach   • ECHO - CONVERTED  10/28/2011    Echo=EF 50%. RVSP-47mmHg.   • ECHO - CONVERTED  07/23/2013    Echo-EF 60-65%, mild to mod MR.   • ECHO - CONVERTED  08/09/2016    EF 60-65%, mild MR, mild " AR, RVSP 30-35 mmHg   • EP STUDY  12/20/2011    EP Study () no inducible V-tach, multifocal PACs and atrial fibrillation     Current Outpatient Medications   Medication Sig Dispense Refill   • apixaban (ELIQUIS) 5 MG tablet tablet Take 1 tablet by mouth Every 12 (Twelve) Hours. 180 tablet 3   • flecainide (TAMBOCOR) 50 MG tablet Takes 2 tablets in am and 1 tablet at night 270 tablet 3   • folic acid (FOLVITE) 1 MG tablet Take 1 mg by mouth Daily.     • methotrexate (RHEUMATREX) 2.5 MG tablet Take 5 tablets by mouth once a week.     • pravastatin (PRAVACHOL) 40 MG tablet Take 1 tablet by mouth Daily. 90 tablet 3   • valsartan (DIOVAN) 160 MG tablet Take 0.5 tablets by mouth Daily. 90 tablet 3     No current facility-administered medications for this visit.     Patient has no known allergies.    Past Medical History:   Diagnosis Date   • A-fib (HCC)    • Deep vein thrombosis (HCC)    • History of cholecystectomy    • Hx of squamous cell carcinoma    • Hypertension    • RA (rheumatoid arthritis) (HCC)      Social History     Socioeconomic History   • Marital status:    Tobacco Use   • Smoking status: Never   • Smokeless tobacco: Never   Vaping Use   • Vaping Use: Never used   Substance and Sexual Activity   • Alcohol use: Not Currently     Comment: rarely   • Drug use: No   • Sexual activity: Defer     Family History   Problem Relation Age of Onset   • No Known Problems Mother      Review of Systems   Constitutional: Positive for weight loss (-5). Negative for decreased appetite and malaise/fatigue.   HENT: Negative.    Eyes: Negative for blurred vision.   Cardiovascular: Negative for chest pain, dyspnea on exertion, leg swelling, palpitations and syncope.   Respiratory: Negative for shortness of breath and sleep disturbances due to breathing.    Endocrine: Negative.    Hematologic/Lymphatic: Negative for bleeding problem. Does not bruise/bleed easily.   Skin: Negative.    Musculoskeletal: Negative  "for falls and myalgias.   Gastrointestinal: Negative for abdominal pain, heartburn and melena.   Genitourinary: Negative for hematuria.   Neurological: Negative for dizziness and light-headedness.   Psychiatric/Behavioral: Negative for altered mental status.   Allergic/Immunologic: Negative.       Objective     /70 (BP Location: Left arm)   Pulse 62   Ht 175.3 cm (69.02\")   Wt 88.8 kg (195 lb 12.8 oz)   BMI 28.90 kg/m²     Vitals and nursing note reviewed.   Constitutional:       General: Not in acute distress.     Appearance: Well-developed. Not diaphoretic.   Eyes:      Pupils: Pupils are equal, round, and reactive to light.   HENT:      Head: Normocephalic.   Pulmonary:      Effort: Pulmonary effort is normal. No respiratory distress.      Breath sounds: Normal breath sounds.   Cardiovascular:      Normal rate. Regular rhythm.   Pulses:     Intact distal pulses.   Abdominal:      General: Bowel sounds are normal.      Palpations: Abdomen is soft.   Musculoskeletal: Normal range of motion.      Cervical back: Normal range of motion. Skin:     General: Skin is warm and dry.   Neurological:      Mental Status: Alert and oriented to person, place, and time.            ECG 12 Lead    Date/Time: 10/19/2022 4:51 PM  Performed by: Michelle Kuo APRN  Authorized by: Michelle Kuo APRN   Comparison: compared with previous ECG from 11/4/2021  Similar to previous ECG  Rhythm: sinus rhythm  Rate: normal  BPM: 62  ST Segments: ST segments normal    Clinical impression: normal ECG  Comments:  ms                Problem List Items Addressed This Visit        Cardiac and Vasculature    PAF (paroxysmal atrial fibrillation) (HCC) - Primary    Relevant Orders    ECG 12 Lead    Long term current use of antiarrhythmic drug    Relevant Orders    ECG 12 Lead      1.  PAF-managed with flecainide, Eliquis.  EKG showed NSR at 62 bpm, normal QT interval.  Continue same plan.     2.  HTN-very well controlled with valsartan.  " Continue low-sodium diet.     3.  Hyperlipidemia-LDL 88 on pravastatin.  Normal coronaries in the past.  Continue heart healthy diet.  Mediterranean diet. Labs to be rechecked with Dr. Weiss.      BMI is >= 25 and <30. (Overweight) The following options were offered after discussion;: nutrition counseling/recommendations               Electronically signed by MARINO Jimenes,  October 22, 2022 10:23 EDT

## 2022-11-07 ENCOUNTER — TELEPHONE (OUTPATIENT)
Dept: CARDIOLOGY | Facility: CLINIC | Age: 77
End: 2022-11-07

## 2022-11-07 NOTE — TELEPHONE ENCOUNTER
Received fax from Dr. Essie Collier for cardiac clearance for patient to have an elective dental surgery. Patient is on Eliquis and they are requesting to hold. According to our records, I do not see where patient has had any stenting. Patient has a history of afib and DVT.         Fax [phone]

## 2023-05-01 ENCOUNTER — OFFICE VISIT (OUTPATIENT)
Dept: CARDIOLOGY | Facility: CLINIC | Age: 78
End: 2023-05-01
Payer: MEDICARE

## 2023-05-01 VITALS
BODY MASS INDEX: 29.77 KG/M2 | SYSTOLIC BLOOD PRESSURE: 140 MMHG | DIASTOLIC BLOOD PRESSURE: 80 MMHG | HEART RATE: 60 BPM | WEIGHT: 201 LBS | HEIGHT: 69 IN

## 2023-05-01 DIAGNOSIS — Z79.01 CURRENT USE OF LONG TERM ANTICOAGULATION: ICD-10-CM

## 2023-05-01 DIAGNOSIS — I10 PRIMARY HYPERTENSION: Primary | ICD-10-CM

## 2023-05-01 DIAGNOSIS — E78.2 MIXED HYPERLIPIDEMIA: ICD-10-CM

## 2023-05-01 DIAGNOSIS — Z79.899 LONG TERM CURRENT USE OF ANTIARRHYTHMIC DRUG: ICD-10-CM

## 2023-05-01 DIAGNOSIS — I48.0 PAF (PAROXYSMAL ATRIAL FIBRILLATION): ICD-10-CM

## 2023-05-01 DIAGNOSIS — I34.0 NON-RHEUMATIC MITRAL REGURGITATION: ICD-10-CM

## 2023-05-01 RX ORDER — PRAVASTATIN SODIUM 40 MG
40 TABLET ORAL DAILY
Qty: 90 TABLET | Refills: 3 | Status: SHIPPED | OUTPATIENT
Start: 2023-05-01

## 2023-05-01 RX ORDER — VALSARTAN 160 MG/1
80 TABLET ORAL DAILY
Qty: 90 TABLET | Refills: 3 | Status: SHIPPED | OUTPATIENT
Start: 2023-05-01

## 2023-05-01 RX ORDER — FLECAINIDE ACETATE 50 MG/1
TABLET ORAL
Qty: 270 TABLET | Refills: 3 | Status: SHIPPED | OUTPATIENT
Start: 2023-05-01

## 2023-05-01 NOTE — LETTER
May 1, 2023       No Recipients    Patient: Jonn Cespedes   YOB: 1945   Date of Visit: 5/1/2023       Dear Jose Weiss MD    Jonn Cespedes was in my office today. Below is a copy of my note.    If you have questions, please do not hesitate to call me. I look forward to following Jonn along with you.         Sincerely,        MARINO Jimenes        CC:   No Recipients    Chief Complaint   Patient presents with   • Follow-up     Cardiac management   • Lab     Last labs 6 months per PCP.   • Palpitations     Rarely has palpitations.    • Dizziness     Rarely notices, feels related to vertigo.   • Med Refill     Needs refills on cardiac medications-90 day     Subjective       Jonn Cespedes is a 78 y.o. male with hypertension, hypercholesterolemia and  PAF. He also had short runs of VT and underwent EP study in 2011 but no inducible VT. Multiple PAC and AF managed with flecainide. Cardiac workup in 2016 was negative for ischemia and slightly diminished LV function at 46%. He takes flecainide 100 mg in am and 50 mg in pm with an occasional extra 50 mg at night.  CHADsVASc score is 2. He is managed with Eliquis.  At his May 2019 office visit the dose of Valsartan was decreased due to hypotension.      He returns today for follow-up.  Denies cardiac symptoms.  No CP, SOB, palpitations remain infrequent.  He occasionally takes extra dose of flecainide.   He recently returned from spending the winter in [place].  He played golf almost every day.  Labs followed by Dr. Weiss.  Most recent copy we have in 8/2020, normal CMP, CBC.  , TRI 49, HDL 66, LDL 88.        Cardiac History:    Past Surgical History:   Procedure Laterality Date   • CARDIAC CATHETERIZATION  01/30/2012    Cath-Normal Coronaries. EF 45%   • CARDIOVASCULAR STRESS TEST  01/11/2012    Stress-6min, 89%THR, 170-76, EF 46%, Negative   • CARDIOVASCULAR STRESS TEST  08/11/2016    6 min 16 sec, 87% THr, 172/76, no definite ischemia    • CARDIOVASCULAR STRESS TEST  08/11/2016    EF 46%, no ischemia   • CONVERTED (HISTORICAL) HOLTER  10/25/2011    Holter-freq PAC's and PVC's, freq runs V-tach   • ECHO - CONVERTED  10/28/2011    Echo=EF 50%. RVSP-47mmHg.   • ECHO - CONVERTED  07/23/2013    Echo-EF 60-65%, mild to mod MR.   • ECHO - CONVERTED  08/09/2016    EF 60-65%, mild MR, mild AR, RVSP 30-35 mmHg   • EP STUDY  12/20/2011    EP Study () no inducible V-tach, multifocal PACs and atrial fibrillation     Current Outpatient Medications   Medication Sig Dispense Refill   • apixaban (ELIQUIS) 5 MG tablet tablet Take 1 tablet by mouth Every 12 (Twelve) Hours. 180 tablet 3   • flecainide (TAMBOCOR) 50 MG tablet Takes 2 tablets in am and 1 tablet at night 270 tablet 3   • folic acid (FOLVITE) 1 MG tablet Take 1 tablet by mouth Daily.     • methotrexate (RHEUMATREX) 2.5 MG tablet Take 5 tablets by mouth once a week.     • pravastatin (PRAVACHOL) 40 MG tablet Take 1 tablet by mouth Daily. 90 tablet 3   • valsartan (DIOVAN) 160 MG tablet Take 0.5 tablets by mouth Daily. 90 tablet 3     No current facility-administered medications for this visit.     Patient has no known allergies.    Past Medical History:   Diagnosis Date   • A-fib    • Deep vein thrombosis    • History of cholecystectomy    • Hx of squamous cell carcinoma    • Hypertension    • RA (rheumatoid arthritis)      Social History     Socioeconomic History   • Marital status:    Tobacco Use   • Smoking status: Never   • Smokeless tobacco: Never   Vaping Use   • Vaping Use: Never used   Substance and Sexual Activity   • Alcohol use: Not Currently     Comment: rarely   • Drug use: No   • Sexual activity: Defer     Family History   Problem Relation Age of Onset   • No Known Problems Mother      Review of Systems   Constitutional: Negative for decreased appetite and malaise/fatigue.   HENT: Negative.    Eyes: Negative for blurred vision.   Cardiovascular: Negative for chest pain,  "dyspnea on exertion, leg swelling, palpitations and syncope.   Respiratory: Negative for shortness of breath and sleep disturbances due to breathing.    Endocrine: Negative.    Hematologic/Lymphatic: Negative for bleeding problem. Does not bruise/bleed easily.   Skin: Negative.    Musculoskeletal: Negative for falls and myalgias.   Gastrointestinal: Negative for abdominal pain, heartburn and melena.   Genitourinary: Negative for hematuria.   Neurological: Negative for dizziness and light-headedness.   Psychiatric/Behavioral: Negative for altered mental status.   Allergic/Immunologic: Negative.       Objective      /80 (BP Location: Left arm)   Pulse 60   Ht 175.3 cm (69.02\")   Wt 91.2 kg (201 lb)   BMI 29.67 kg/m²     Vitals and nursing note reviewed.   Constitutional:       General: Not in acute distress.     Appearance: Well-developed. Not diaphoretic.   Eyes:      Pupils: Pupils are equal, round, and reactive to light.   HENT:      Head: Normocephalic.   Pulmonary:      Effort: Pulmonary effort is normal. No respiratory distress.      Breath sounds: Normal breath sounds.   Cardiovascular:      Normal rate. Regular rhythm.   Pulses:     Intact distal pulses.   Edema:     Peripheral edema absent.   Abdominal:      General: Bowel sounds are normal.      Palpations: Abdomen is soft.   Musculoskeletal: Normal range of motion.      Cervical back: Normal range of motion. Skin:     General: Skin is warm and dry.   Neurological:      Mental Status: Alert and oriented to person, place, and time.          ECG 12 Lead    Date/Time: 5/1/2023 8:18 AM  Performed by: Michelle Kuo APRN  Authorized by: Michelle Kuo APRN   Comparison: compared with previous ECG   Rhythm: sinus rhythm  Rate: normal  ST Segments: ST segments normal    Clinical impression: normal ECG               Problem List Items Addressed This Visit          Cardiac and Vasculature    PAF (paroxysmal atrial fibrillation)    HTN (hypertension) - Primary "    Relevant Medications    valsartan (DIOVAN) 160 MG tablet    Hyperlipemia    Relevant Medications    pravastatin (PRAVACHOL) 40 MG tablet    Non-rheumatic mitral regurgitation    Long term current use of antiarrhythmic drug       Coag and Thromboembolic    Current use of long term anticoagulation      1.  PAF-managed with flecainide, Eliquis.  EKG showed NSR at 60 bpm, normal QT interval.  Continue same plan.  Refills sent.     2.  HTN- well controlled with valsartan.  Continue low-sodium diet.     3.  Hyperlipidemia-LDL 88 on pravastatin.  Normal coronaries in the past.  Continue heart healthy diet.  Mediterranean diet. Labs followed by Dr. Weiss.    4.  Mitral regurgitation- mild, clinically stable.  No significant murmur auscultated.    No changes made today.  Refill sent.  Could we get copy of most recent labs.  Follow-up in 6 months or sooner if needed.     BMI is >= 25 and <30. (Overweight) The following options were offered after discussion;: nutrition counseling/recommendations              Electronically signed by MARINO Jimenes,  May 1, 2023 11:14 EDT

## 2023-05-01 NOTE — PROGRESS NOTES
Chief Complaint   Patient presents with   • Follow-up     Cardiac management   • Lab     Last labs 6 months per PCP.   • Palpitations     Rarely has palpitations.    • Dizziness     Rarely notices, feels related to vertigo.   • Med Refill     Needs refills on cardiac medications-90 day     Subjective       Jonn Cespedes is a 78 y.o. male with hypertension, hypercholesterolemia and  PAF. He also had short runs of VT and underwent EP study in 2011 but no inducible VT. Multiple PAC and AF managed with flecainide. Cardiac workup in 2016 was negative for ischemia and slightly diminished LV function at 46%. He takes flecainide 100 mg in am and 50 mg in pm with an occasional extra 50 mg at night.  CHADsVASc score is 2. He is managed with Eliquis.  At his May 2019 office visit the dose of Valsartan was decreased due to hypotension.      He returns today for follow-up.  Denies cardiac symptoms.  No CP, SOB, palpitations remain infrequent.  He occasionally takes extra dose of flecainide.   He recently returned from spending the winter in [place].  He played golf almost every day.  Labs followed by Dr. Weiss.  Most recent copy we have in 8/2020, normal CMP, CBC.  , TRI 49, HDL 66, LDL 88.         Cardiac History:    Past Surgical History:   Procedure Laterality Date   • CARDIAC CATHETERIZATION  01/30/2012    Cath-Normal Coronaries. EF 45%   • CARDIOVASCULAR STRESS TEST  01/11/2012    Stress-6min, 89%THR, 170-76, EF 46%, Negative   • CARDIOVASCULAR STRESS TEST  08/11/2016    6 min 16 sec, 87% THr, 172/76, no definite ischemia   • CARDIOVASCULAR STRESS TEST  08/11/2016    EF 46%, no ischemia   • CONVERTED (HISTORICAL) HOLTER  10/25/2011    Holter-freq PAC's and PVC's, freq runs V-tach   • ECHO - CONVERTED  10/28/2011    Echo=EF 50%. RVSP-47mmHg.   • ECHO - CONVERTED  07/23/2013    Echo-EF 60-65%, mild to mod MR.   • ECHO - CONVERTED  08/09/2016    EF 60-65%, mild MR, mild AR, RVSP 30-35 mmHg   • EP STUDY  12/20/2011     EP Study () no inducible V-tach, multifocal PACs and atrial fibrillation     Current Outpatient Medications   Medication Sig Dispense Refill   • apixaban (ELIQUIS) 5 MG tablet tablet Take 1 tablet by mouth Every 12 (Twelve) Hours. 180 tablet 3   • flecainide (TAMBOCOR) 50 MG tablet Takes 2 tablets in am and 1 tablet at night 270 tablet 3   • folic acid (FOLVITE) 1 MG tablet Take 1 tablet by mouth Daily.     • methotrexate (RHEUMATREX) 2.5 MG tablet Take 5 tablets by mouth once a week.     • pravastatin (PRAVACHOL) 40 MG tablet Take 1 tablet by mouth Daily. 90 tablet 3   • valsartan (DIOVAN) 160 MG tablet Take 0.5 tablets by mouth Daily. 90 tablet 3     No current facility-administered medications for this visit.     Patient has no known allergies.    Past Medical History:   Diagnosis Date   • A-fib    • Deep vein thrombosis    • History of cholecystectomy    • Hx of squamous cell carcinoma    • Hypertension    • RA (rheumatoid arthritis)      Social History     Socioeconomic History   • Marital status:    Tobacco Use   • Smoking status: Never   • Smokeless tobacco: Never   Vaping Use   • Vaping Use: Never used   Substance and Sexual Activity   • Alcohol use: Not Currently     Comment: rarely   • Drug use: No   • Sexual activity: Defer     Family History   Problem Relation Age of Onset   • No Known Problems Mother      Review of Systems   Constitutional: Negative for decreased appetite and malaise/fatigue.   HENT: Negative.    Eyes: Negative for blurred vision.   Cardiovascular: Negative for chest pain, dyspnea on exertion, leg swelling, palpitations and syncope.   Respiratory: Negative for shortness of breath and sleep disturbances due to breathing.    Endocrine: Negative.    Hematologic/Lymphatic: Negative for bleeding problem. Does not bruise/bleed easily.   Skin: Negative.    Musculoskeletal: Negative for falls and myalgias.   Gastrointestinal: Negative for abdominal pain, heartburn and melena.  "  Genitourinary: Negative for hematuria.   Neurological: Negative for dizziness and light-headedness.   Psychiatric/Behavioral: Negative for altered mental status.   Allergic/Immunologic: Negative.       Objective     /80 (BP Location: Left arm)   Pulse 60   Ht 175.3 cm (69.02\")   Wt 91.2 kg (201 lb)   BMI 29.67 kg/m²     Vitals and nursing note reviewed.   Constitutional:       General: Not in acute distress.     Appearance: Well-developed. Not diaphoretic.   Eyes:      Pupils: Pupils are equal, round, and reactive to light.   HENT:      Head: Normocephalic.   Pulmonary:      Effort: Pulmonary effort is normal. No respiratory distress.      Breath sounds: Normal breath sounds.   Cardiovascular:      Normal rate. Regular rhythm.   Pulses:     Intact distal pulses.   Edema:     Peripheral edema absent.   Abdominal:      General: Bowel sounds are normal.      Palpations: Abdomen is soft.   Musculoskeletal: Normal range of motion.      Cervical back: Normal range of motion. Skin:     General: Skin is warm and dry.   Neurological:      Mental Status: Alert and oriented to person, place, and time.          ECG 12 Lead    Date/Time: 5/1/2023 8:18 AM  Performed by: Michelle Kuo APRN  Authorized by: Michelle Kuo APRN   Comparison: compared with previous ECG   Rhythm: sinus rhythm  Rate: normal  ST Segments: ST segments normal    Clinical impression: normal ECG                Problem List Items Addressed This Visit        Cardiac and Vasculature    PAF (paroxysmal atrial fibrillation)    HTN (hypertension) - Primary    Relevant Medications    valsartan (DIOVAN) 160 MG tablet    Hyperlipemia    Relevant Medications    pravastatin (PRAVACHOL) 40 MG tablet    Non-rheumatic mitral regurgitation    Long term current use of antiarrhythmic drug       Coag and Thromboembolic    Current use of long term anticoagulation      1.  PAF-managed with flecainide, Eliquis.  EKG showed NSR at 60 bpm, normal QT interval.  Continue " same plan.  Refills sent.     2.  HTN- well controlled with valsartan.  Continue low-sodium diet.     3.  Hyperlipidemia-LDL 88 on pravastatin.  Normal coronaries in the past.  Continue heart healthy diet.  Mediterranean diet. Labs followed by Dr. Weiss.    4.  Mitral regurgitation- mild, clinically stable.  No significant murmur auscultated.    No changes made today.  Refill sent.  Could we get copy of most recent labs.  Follow-up in 6 months or sooner if needed.     BMI is >= 25 and <30. (Overweight) The following options were offered after discussion;: nutrition counseling/recommendations               Electronically signed by MARINO Jimenes,  May 1, 2023 11:14 EDT

## 2023-09-05 ENCOUNTER — OFFICE VISIT (OUTPATIENT)
Dept: CARDIOLOGY | Facility: CLINIC | Age: 78
End: 2023-09-05
Payer: MEDICARE

## 2023-09-05 VITALS
HEIGHT: 69 IN | WEIGHT: 192.2 LBS | DIASTOLIC BLOOD PRESSURE: 60 MMHG | HEART RATE: 72 BPM | SYSTOLIC BLOOD PRESSURE: 128 MMHG | BODY MASS INDEX: 28.47 KG/M2

## 2023-09-05 DIAGNOSIS — R35.1 NOCTURIA: ICD-10-CM

## 2023-09-05 DIAGNOSIS — R73.9 HYPERGLYCEMIA: ICD-10-CM

## 2023-09-05 DIAGNOSIS — R06.02 SHORTNESS OF BREATH: ICD-10-CM

## 2023-09-05 DIAGNOSIS — Z79.899 LONG TERM CURRENT USE OF ANTIARRHYTHMIC DRUG: ICD-10-CM

## 2023-09-05 DIAGNOSIS — I48.0 PAF (PAROXYSMAL ATRIAL FIBRILLATION): ICD-10-CM

## 2023-09-05 DIAGNOSIS — E78.2 MIXED HYPERLIPIDEMIA: ICD-10-CM

## 2023-09-05 DIAGNOSIS — E55.9 VITAMIN D DEFICIENCY: ICD-10-CM

## 2023-09-05 DIAGNOSIS — I10 PRIMARY HYPERTENSION: Primary | ICD-10-CM

## 2023-09-05 DIAGNOSIS — I34.0 NON-RHEUMATIC MITRAL REGURGITATION: ICD-10-CM

## 2023-09-05 DIAGNOSIS — R42 DIZZINESS: ICD-10-CM

## 2023-09-05 PROCEDURE — 3078F DIAST BP <80 MM HG: CPT | Performed by: NURSE PRACTITIONER

## 2023-09-05 PROCEDURE — 3074F SYST BP LT 130 MM HG: CPT | Performed by: NURSE PRACTITIONER

## 2023-09-05 PROCEDURE — 1160F RVW MEDS BY RX/DR IN RCRD: CPT | Performed by: NURSE PRACTITIONER

## 2023-09-05 PROCEDURE — 1159F MED LIST DOCD IN RCRD: CPT | Performed by: NURSE PRACTITIONER

## 2023-09-05 PROCEDURE — 99214 OFFICE O/P EST MOD 30 MIN: CPT | Performed by: NURSE PRACTITIONER

## 2023-09-05 NOTE — PROGRESS NOTES
"Chief Complaint   Patient presents with    Follow-up     Cardiac management    Lab     No current labs.    Dizziness     Having more episodes, at times feels like he could pass out. He had been taking 4 tablets of Flecainide daily. He states \"it just don't feel right, and having a strange feeling\".     Blood pressure     Had been fluctuating, he reports using wrist cuff and does not feel it is accurate.     Med Refill     Does not need refills at this time.     Subjective       Jonn Cespedes is a 78 y.o. male with hypertension, hypercholesterolemia and PAF. He also had short runs of VT and underwent EP study in 2011 but no inducible VT. Multiple PAC and AF managed with flecainide. Cardiac workup in 2016 was negative for ischemia and slightly diminished LV function at 46%. He takes flecainide 100 mg in am and 50 mg in pm with an occasional extra 50 mg at night.  CHADsVASc score is 2. He is managed with Eliquis.  At his May 2019 office visit the dose of Valsartan was decreased due to hypotension.      He contacted the office for early follow up due to symptoms of left sided chest pressure, light-headedness. \"Something just doesn't feel right\". 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 He spend winter in [place]. BP well controlled. No recurrent AF. No recent labs. In 2020, LDL 88, HDL 66.          Cardiac History:    Past Surgical History:   Procedure Laterality Date    CARDIAC CATHETERIZATION  01/30/2012    Cath-Normal Coronaries. EF 45%    CARDIOVASCULAR STRESS TEST  01/11/2012    Stress-6min, 89%THR, 170-76, EF 46%, Negative    CARDIOVASCULAR STRESS TEST  08/11/2016    6 min 16 sec, 87% THr, 172/76, no definite ischemia    CARDIOVASCULAR STRESS TEST  08/11/2016    EF 46%, no ischemia    CONVERTED (HISTORICAL) HOLTER  10/25/2011    Holter-freq PAC's and PVC's, freq runs V-tach    ECHO - CONVERTED  10/28/2011    " Echo=EF 50%. RVSP-47mmHg.    ECHO - CONVERTED  07/23/2013    Echo-EF 60-65%, mild to mod MR.    ECHO - CONVERTED  08/09/2016    EF 60-65%, mild MR, mild AR, RVSP 30-35 mmHg    EP STUDY  12/20/2011    EP Study () no inducible V-tach, multifocal PACs and atrial fibrillation     Current Outpatient Medications   Medication Sig Dispense Refill    apixaban (ELIQUIS) 5 MG tablet tablet Take 1 tablet by mouth Every 12 (Twelve) Hours. 180 tablet 3    flecainide (TAMBOCOR) 50 MG tablet Takes 2 tablets in am and 1 tablet at night (Patient taking differently: Takes 2 tablets in am and 2 tablet at night) 270 tablet 3    folic acid (FOLVITE) 1 MG tablet Take 1 tablet by mouth Daily.      methotrexate (RHEUMATREX) 2.5 MG tablet Take 5 tablets by mouth once a week.      pravastatin (PRAVACHOL) 40 MG tablet Take 1 tablet by mouth Daily. 90 tablet 3    valsartan (DIOVAN) 160 MG tablet Take 0.5 tablets by mouth Daily. 90 tablet 3     No current facility-administered medications for this visit.     Patient has no known allergies.    Past Medical History:   Diagnosis Date    A-fib     Deep vein thrombosis     History of cholecystectomy     Hx of squamous cell carcinoma     Hypertension     RA (rheumatoid arthritis)      Social History     Socioeconomic History    Marital status:    Tobacco Use    Smoking status: Never    Smokeless tobacco: Never   Vaping Use    Vaping Use: Never used   Substance and Sexual Activity    Alcohol use: Not Currently     Comment: rarely    Drug use: No    Sexual activity: Defer     Family History   Problem Relation Age of Onset    No Known Problems Mother      Review of Systems   Constitutional: Positive for malaise/fatigue and weight loss (-9 lb). Negative for decreased appetite.   HENT: Negative.     Eyes:  Negative for blurred vision.   Cardiovascular:  Positive for chest pain and dyspnea on exertion. Negative for leg swelling, palpitations and syncope.   Respiratory:  Negative for  "shortness of breath and sleep disturbances due to breathing.    Endocrine: Negative.    Hematologic/Lymphatic: Negative for bleeding problem. Does not bruise/bleed easily.   Skin: Negative.    Musculoskeletal:  Negative for falls and myalgias.   Gastrointestinal:  Negative for abdominal pain, heartburn and melena.   Genitourinary:  Negative for hematuria.   Neurological:  Positive for light-headedness. Negative for dizziness.   Psychiatric/Behavioral:  Negative for altered mental status.    Allergic/Immunologic: Negative.       Objective     /60 (BP Location: Right arm)   Pulse 72   Ht 175.3 cm (69.02\")   Wt 87.2 kg (192 lb 3.2 oz)   BMI 28.37 kg/m²     Vitals and nursing note reviewed.   Constitutional:       General: Not in acute distress.     Appearance: Well-developed. Not diaphoretic.   Eyes:      Pupils: Pupils are equal, round, and reactive to light.   HENT:      Head: Normocephalic.   Pulmonary:      Effort: Pulmonary effort is normal. No respiratory distress.      Breath sounds: Normal breath sounds.   Cardiovascular:      Normal rate. Regular rhythm.   Pulses:     Intact distal pulses.   Edema:     Peripheral edema absent.   Abdominal:      General: Bowel sounds are normal.      Palpations: Abdomen is soft.   Musculoskeletal: Normal range of motion.      Cervical back: Normal range of motion. Skin:     General: Skin is warm and dry.   Neurological:      Mental Status: Alert and oriented to person, place, and time.        ECG 12 Lead    Date/Time: 9/6/2023 10:19 AM  Performed by: Michelle Kuo APRN  Authorized by: Michelle Kuo APRN   Comparison: compared with previous ECG   Similar to previous ECG  Rhythm: sinus rhythm  Rate: normal  BPM: 72  ST Segments: ST segments normal    Clinical impression: normal ECG  Comments: QTc 439 ms             Problem List Items Addressed This Visit          Cardiac and Vasculature    PAF (paroxysmal atrial fibrillation)    Relevant Orders    US Carotid Bilateral "    Stress Test With Myocardial Perfusion One Day    Adult Transthoracic Echo Complete W/ Cont if Necessary Per Protocol    CBC & Differential    Comprehensive Metabolic Panel    Vitamin B12    TSH    Magnesium    HTN (hypertension) - Primary    Relevant Orders    US Carotid Bilateral    Stress Test With Myocardial Perfusion One Day    Adult Transthoracic Echo Complete W/ Cont if Necessary Per Protocol    CBC & Differential    Comprehensive Metabolic Panel    Hyperlipemia    Relevant Orders    US Carotid Bilateral    Stress Test With Myocardial Perfusion One Day    Adult Transthoracic Echo Complete W/ Cont if Necessary Per Protocol    Lipid Panel    CBC & Differential    Comprehensive Metabolic Panel    Non-rheumatic mitral regurgitation    Relevant Orders    US Carotid Bilateral    Stress Test With Myocardial Perfusion One Day    Adult Transthoracic Echo Complete W/ Cont if Necessary Per Protocol    CBC & Differential    Comprehensive Metabolic Panel    Magnesium    Long term current use of antiarrhythmic drug    Relevant Orders    US Carotid Bilateral    Stress Test With Myocardial Perfusion One Day    Adult Transthoracic Echo Complete W/ Cont if Necessary Per Protocol    CBC & Differential    Comprehensive Metabolic Panel     Other Visit Diagnoses       Dizziness        Relevant Orders    US Carotid Bilateral    Adult Transthoracic Echo Complete W/ Cont if Necessary Per Protocol    CBC & Differential    Comprehensive Metabolic Panel    Shortness of breath        Relevant Orders    Stress Test With Myocardial Perfusion One Day    Adult Transthoracic Echo Complete W/ Cont if Necessary Per Protocol    CBC & Differential    Comprehensive Metabolic Panel    Hemoglobin A1c    Vitamin B12    TSH    Magnesium    Nocturia        Relevant Orders    PSA DIAGNOSTIC    Vitamin D deficiency        Relevant Orders    Vitamin D,25-Hydroxy    Hyperglycemia        Relevant Orders    CBC & Differential    Comprehensive Metabolic  Panel    Hemoglobin A1c             1.  PAF-managed with flecainide, Eliquis.  EKG showed NSR at 72 bpm, normal QT interval. Continue flecainide for now. Cardiac testing, stress and echo ordered. If unrevealing, will place holter to further evaluate rhythm disturbance causing dizziness.      2.  HTN- well controlled with valsartan.  Continue low-sodium diet.     3.  Hyperlipidemia-LDL 88 on pravastatin.  Normal coronaries in the past.  Continue heart healthy diet.  Mediterranean diet. will check labs. CBC, CMP, F. Lipid, TSH, mag, A1C, B12, vit D.      4. Chest pressure/YOUSIF- recommend cardiac evaluation in the form of nuclear stress and echocardiogram to evaluate exercise tolerance, blood pressure response, LVEF, coronary artery perfusion.     5. Light-headedness- carotid US to rule out stenosis      Further recommendations to follow stress, echo, carotid US, and labs.     Follow-up in 6 months or sooner if testing is abnormal.              Electronically signed by MARINO Jimenes,  September 6, 2023 10:24 EDT

## 2023-09-05 NOTE — LETTER
"September 6, 2023       No Recipients    Patient: Jonn Cespedes   YOB: 1945   Date of Visit: 9/5/2023       Dear Jose Weiss MD    Jonn Cespedes was in my office today. Below is a copy of my note.    If you have questions, please do not hesitate to call me. I look forward to following Jonn along with you.         Sincerely,        MARINO Jimenes        CC:   No Recipients    Chief Complaint   Patient presents with   • Follow-up     Cardiac management   • Lab     No current labs.   • Dizziness     Having more episodes, at times feels like he could pass out. He had been taking 4 tablets of Flecainide daily. He states \"it just don't feel right, and having a strange feeling\".    • Blood pressure     Had been fluctuating, he reports using wrist cuff and does not feel it is accurate.    • Med Refill     Does not need refills at this time.     Subjective      Jonn Cespedes is a 78 y.o. male with hypertension, hypercholesterolemia and PAF. He also had short runs of VT and underwent EP study in 2011 but no inducible VT. Multiple PAC and AF managed with flecainide. Cardiac workup in 2016 was negative for ischemia and slightly diminished LV function at 46%. He takes flecainide 100 mg in am and 50 mg in pm with an occasional extra 50 mg at night.  CHADsVASc score is 2. He is managed with Eliquis.  At his May 2019 office visit the dose of Valsartan was decreased due to hypotension.      He contacted the office for early follow up due to symptoms of left sided chest pressure, light-headedness. \"Something just doesn't feel right\". 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 He spend winter in [place]. BP well controlled. No recurrent AF. No recent labs. In 2020, LDL 88, HDL 66.          Cardiac History:    Past Surgical History:   Procedure Laterality Date   • CARDIAC CATHETERIZATION  01/30/2012    " Cath-Normal Coronaries. EF 45%   • CARDIOVASCULAR STRESS TEST  01/11/2012    Stress-6min, 89%THR, 170-76, EF 46%, Negative   • CARDIOVASCULAR STRESS TEST  08/11/2016    6 min 16 sec, 87% THr, 172/76, no definite ischemia   • CARDIOVASCULAR STRESS TEST  08/11/2016    EF 46%, no ischemia   • CONVERTED (HISTORICAL) HOLTER  10/25/2011    Holter-freq PAC's and PVC's, freq runs V-tach   • ECHO - CONVERTED  10/28/2011    Echo=EF 50%. RVSP-47mmHg.   • ECHO - CONVERTED  07/23/2013    Echo-EF 60-65%, mild to mod MR.   • ECHO - CONVERTED  08/09/2016    EF 60-65%, mild MR, mild AR, RVSP 30-35 mmHg   • EP STUDY  12/20/2011    EP Study () no inducible V-tach, multifocal PACs and atrial fibrillation     Current Outpatient Medications   Medication Sig Dispense Refill   • apixaban (ELIQUIS) 5 MG tablet tablet Take 1 tablet by mouth Every 12 (Twelve) Hours. 180 tablet 3   • flecainide (TAMBOCOR) 50 MG tablet Takes 2 tablets in am and 1 tablet at night (Patient taking differently: Takes 2 tablets in am and 2 tablet at night) 270 tablet 3   • folic acid (FOLVITE) 1 MG tablet Take 1 tablet by mouth Daily.     • methotrexate (RHEUMATREX) 2.5 MG tablet Take 5 tablets by mouth once a week.     • pravastatin (PRAVACHOL) 40 MG tablet Take 1 tablet by mouth Daily. 90 tablet 3   • valsartan (DIOVAN) 160 MG tablet Take 0.5 tablets by mouth Daily. 90 tablet 3     No current facility-administered medications for this visit.     Patient has no known allergies.    Past Medical History:   Diagnosis Date   • A-fib    • Deep vein thrombosis    • History of cholecystectomy    • Hx of squamous cell carcinoma    • Hypertension    • RA (rheumatoid arthritis)      Social History     Socioeconomic History   • Marital status:    Tobacco Use   • Smoking status: Never   • Smokeless tobacco: Never   Vaping Use   • Vaping Use: Never used   Substance and Sexual Activity   • Alcohol use: Not Currently     Comment: rarely   • Drug use: No   •  "Sexual activity: Defer     Family History   Problem Relation Age of Onset   • No Known Problems Mother      Review of Systems   Constitutional: Positive for malaise/fatigue and weight loss (-9 lb). Negative for decreased appetite.   HENT: Negative.     Eyes:  Negative for blurred vision.   Cardiovascular:  Positive for chest pain and dyspnea on exertion. Negative for leg swelling, palpitations and syncope.   Respiratory:  Negative for shortness of breath and sleep disturbances due to breathing.    Endocrine: Negative.    Hematologic/Lymphatic: Negative for bleeding problem. Does not bruise/bleed easily.   Skin: Negative.    Musculoskeletal:  Negative for falls and myalgias.   Gastrointestinal:  Negative for abdominal pain, heartburn and melena.   Genitourinary:  Negative for hematuria.   Neurological:  Positive for light-headedness. Negative for dizziness.   Psychiatric/Behavioral:  Negative for altered mental status.    Allergic/Immunologic: Negative.       Objective    /60 (BP Location: Right arm)   Pulse 72   Ht 175.3 cm (69.02\")   Wt 87.2 kg (192 lb 3.2 oz)   BMI 28.37 kg/m²     Vitals and nursing note reviewed.   Constitutional:       General: Not in acute distress.     Appearance: Well-developed. Not diaphoretic.   Eyes:      Pupils: Pupils are equal, round, and reactive to light.   HENT:      Head: Normocephalic.   Pulmonary:      Effort: Pulmonary effort is normal. No respiratory distress.      Breath sounds: Normal breath sounds.   Cardiovascular:      Normal rate. Regular rhythm.   Pulses:     Intact distal pulses.   Edema:     Peripheral edema absent.   Abdominal:      General: Bowel sounds are normal.      Palpations: Abdomen is soft.   Musculoskeletal: Normal range of motion.      Cervical back: Normal range of motion. Skin:     General: Skin is warm and dry.   Neurological:      Mental Status: Alert and oriented to person, place, and time.        ECG 12 Lead    Date/Time: 9/6/2023 10:19 " AM  Performed by: Michelle Kuo APRN  Authorized by: Michelle Kuo APRN   Comparison: compared with previous ECG   Similar to previous ECG  Rhythm: sinus rhythm  Rate: normal  BPM: 72  ST Segments: ST segments normal    Clinical impression: normal ECG  Comments: QTc 439 ms             Problem List Items Addressed This Visit          Cardiac and Vasculature    PAF (paroxysmal atrial fibrillation)    Relevant Orders    US Carotid Bilateral    Stress Test With Myocardial Perfusion One Day    Adult Transthoracic Echo Complete W/ Cont if Necessary Per Protocol    CBC & Differential    Comprehensive Metabolic Panel    Vitamin B12    TSH    Magnesium    HTN (hypertension) - Primary    Relevant Orders    US Carotid Bilateral    Stress Test With Myocardial Perfusion One Day    Adult Transthoracic Echo Complete W/ Cont if Necessary Per Protocol    CBC & Differential    Comprehensive Metabolic Panel    Hyperlipemia    Relevant Orders    US Carotid Bilateral    Stress Test With Myocardial Perfusion One Day    Adult Transthoracic Echo Complete W/ Cont if Necessary Per Protocol    Lipid Panel    CBC & Differential    Comprehensive Metabolic Panel    Non-rheumatic mitral regurgitation    Relevant Orders    US Carotid Bilateral    Stress Test With Myocardial Perfusion One Day    Adult Transthoracic Echo Complete W/ Cont if Necessary Per Protocol    CBC & Differential    Comprehensive Metabolic Panel    Magnesium    Long term current use of antiarrhythmic drug    Relevant Orders    US Carotid Bilateral    Stress Test With Myocardial Perfusion One Day    Adult Transthoracic Echo Complete W/ Cont if Necessary Per Protocol    CBC & Differential    Comprehensive Metabolic Panel     Other Visit Diagnoses       Dizziness        Relevant Orders    US Carotid Bilateral    Adult Transthoracic Echo Complete W/ Cont if Necessary Per Protocol    CBC & Differential    Comprehensive Metabolic Panel    Shortness of breath        Relevant Orders     Stress Test With Myocardial Perfusion One Day    Adult Transthoracic Echo Complete W/ Cont if Necessary Per Protocol    CBC & Differential    Comprehensive Metabolic Panel    Hemoglobin A1c    Vitamin B12    TSH    Magnesium    Nocturia        Relevant Orders    PSA DIAGNOSTIC    Vitamin D deficiency        Relevant Orders    Vitamin D,25-Hydroxy    Hyperglycemia        Relevant Orders    CBC & Differential    Comprehensive Metabolic Panel    Hemoglobin A1c             1.  PAF-managed with flecainide, Eliquis.  EKG showed NSR at 72 bpm, normal QT interval. Continue flecainide for now. Cardiac testing, stress and echo ordered. If unrevealing, will place holter to further evaluate rhythm disturbance causing dizziness.      2.  HTN- well controlled with valsartan.  Continue low-sodium diet.     3.  Hyperlipidemia-LDL 88 on pravastatin.  Normal coronaries in the past.  Continue heart healthy diet.  Mediterranean diet. will check labs. CBC, CMP, F. Lipid, TSH, mag, A1C, B12, vit D.      4. Chest pressure/YOUSIF- recommend cardiac evaluation in the form of nuclear stress and echocardiogram to evaluate exercise tolerance, blood pressure response, LVEF, coronary artery perfusion.     5. Light-headedness- carotid US to rule out stenosis      Further recommendations to follow stress, echo, carotid US, and labs.     Follow-up in 6 months or sooner if testing is abnormal.              Electronically signed by MARINO Jimenes,  September 6, 2023 10:24 EDT

## 2023-09-06 PROCEDURE — 93000 ELECTROCARDIOGRAM COMPLETE: CPT | Performed by: NURSE PRACTITIONER

## 2023-09-25 ENCOUNTER — HOSPITAL ENCOUNTER (OUTPATIENT)
Dept: CARDIOLOGY | Facility: HOSPITAL | Age: 78
Discharge: HOME | End: 2023-09-25
Payer: MEDICARE

## 2023-09-25 ENCOUNTER — LAB (OUTPATIENT)
Dept: LAB | Facility: HOSPITAL | Age: 78
End: 2023-09-25
Payer: MEDICARE

## 2023-09-25 DIAGNOSIS — I34.0 NON-RHEUMATIC MITRAL REGURGITATION: ICD-10-CM

## 2023-09-25 DIAGNOSIS — R42 DIZZINESS: ICD-10-CM

## 2023-09-25 DIAGNOSIS — E55.9 VITAMIN D DEFICIENCY: ICD-10-CM

## 2023-09-25 DIAGNOSIS — R06.02 SHORTNESS OF BREATH: ICD-10-CM

## 2023-09-25 DIAGNOSIS — R73.9 HYPERGLYCEMIA: ICD-10-CM

## 2023-09-25 DIAGNOSIS — I48.0 PAF (PAROXYSMAL ATRIAL FIBRILLATION): ICD-10-CM

## 2023-09-25 DIAGNOSIS — I10 PRIMARY HYPERTENSION: ICD-10-CM

## 2023-09-25 DIAGNOSIS — E78.2 MIXED HYPERLIPIDEMIA: ICD-10-CM

## 2023-09-25 DIAGNOSIS — Z79.899 LONG TERM CURRENT USE OF ANTIARRHYTHMIC DRUG: ICD-10-CM

## 2023-09-25 LAB
ALBUMIN SERPL-MCNC: 4.1 G/DL (ref 3.5–5.2)
ALBUMIN/GLOB SERPL: 1.3 G/DL
ALP SERPL-CCNC: 76 U/L (ref 39–117)
ALT SERPL W P-5'-P-CCNC: 18 U/L (ref 1–41)
ANION GAP SERPL CALCULATED.3IONS-SCNC: 10.8 MMOL/L (ref 5–15)
AST SERPL-CCNC: 22 U/L (ref 1–40)
BASOPHILS # BLD AUTO: 0.04 10*3/MM3 (ref 0–0.2)
BASOPHILS NFR BLD AUTO: 0.6 % (ref 0–1.5)
BILIRUB SERPL-MCNC: 1.1 MG/DL (ref 0–1.2)
BUN SERPL-MCNC: 16 MG/DL (ref 8–23)
BUN/CREAT SERPL: 14 (ref 7–25)
CALCIUM SPEC-SCNC: 9.3 MG/DL (ref 8.6–10.5)
CHLORIDE SERPL-SCNC: 103 MMOL/L (ref 98–107)
CHOLEST SERPL-MCNC: 159 MG/DL (ref 0–200)
CO2 SERPL-SCNC: 25.2 MMOL/L (ref 22–29)
CREAT SERPL-MCNC: 1.14 MG/DL (ref 0.76–1.27)
DEPRECATED RDW RBC AUTO: 48.9 FL (ref 37–54)
EGFRCR SERPLBLD CKD-EPI 2021: 66.2 ML/MIN/1.73
EOSINOPHIL # BLD AUTO: 0.23 10*3/MM3 (ref 0–0.4)
EOSINOPHIL NFR BLD AUTO: 3.2 % (ref 0.3–6.2)
ERYTHROCYTE [DISTWIDTH] IN BLOOD BY AUTOMATED COUNT: 14.2 % (ref 12.3–15.4)
GLOBULIN UR ELPH-MCNC: 3.2 GM/DL
GLUCOSE SERPL-MCNC: 98 MG/DL (ref 65–99)
HBA1C MFR BLD: 5.6 % (ref 4.8–5.6)
HCT VFR BLD AUTO: 45.2 % (ref 37.5–51)
HDLC SERPL-MCNC: 62 MG/DL (ref 40–60)
HGB BLD-MCNC: 14.7 G/DL (ref 13–17.7)
IMM GRANULOCYTES # BLD AUTO: 0.02 10*3/MM3 (ref 0–0.05)
IMM GRANULOCYTES NFR BLD AUTO: 0.3 % (ref 0–0.5)
LDLC SERPL CALC-MCNC: 86 MG/DL (ref 0–100)
LDLC/HDLC SERPL: 1.39 {RATIO}
LYMPHOCYTES # BLD AUTO: 1.84 10*3/MM3 (ref 0.7–3.1)
LYMPHOCYTES NFR BLD AUTO: 25.6 % (ref 19.6–45.3)
MAGNESIUM SERPL-MCNC: 1.9 MG/DL (ref 1.6–2.4)
MCH RBC QN AUTO: 30.8 PG (ref 26.6–33)
MCHC RBC AUTO-ENTMCNC: 32.5 G/DL (ref 31.5–35.7)
MCV RBC AUTO: 94.8 FL (ref 79–97)
MONOCYTES # BLD AUTO: 0.72 10*3/MM3 (ref 0.1–0.9)
MONOCYTES NFR BLD AUTO: 10 % (ref 5–12)
NEUTROPHILS NFR BLD AUTO: 4.33 10*3/MM3 (ref 1.7–7)
NEUTROPHILS NFR BLD AUTO: 60.3 % (ref 42.7–76)
NRBC BLD AUTO-RTO: 0 /100 WBC (ref 0–0.2)
PLATELET # BLD AUTO: 252 10*3/MM3 (ref 140–450)
PMV BLD AUTO: 9.8 FL (ref 6–12)
POTASSIUM SERPL-SCNC: 4.6 MMOL/L (ref 3.5–5.2)
PROT SERPL-MCNC: 7.3 G/DL (ref 6–8.5)
RBC # BLD AUTO: 4.77 10*6/MM3 (ref 4.14–5.8)
SODIUM SERPL-SCNC: 139 MMOL/L (ref 136–145)
TRIGL SERPL-MCNC: 55 MG/DL (ref 0–150)
TSH SERPL DL<=0.05 MIU/L-ACNC: 2.66 UIU/ML (ref 0.27–4.2)
VLDLC SERPL-MCNC: 11 MG/DL (ref 5–40)
WBC NRBC COR # BLD: 7.18 10*3/MM3 (ref 3.4–10.8)

## 2023-09-25 PROCEDURE — 0 TECHNETIUM SESTAMIBI: Performed by: INTERNAL MEDICINE

## 2023-09-25 PROCEDURE — 84443 ASSAY THYROID STIM HORMONE: CPT | Performed by: NURSE PRACTITIONER

## 2023-09-25 PROCEDURE — 93017 CV STRESS TEST TRACING ONLY: CPT

## 2023-09-25 PROCEDURE — A9500 TC99M SESTAMIBI: HCPCS | Performed by: INTERNAL MEDICINE

## 2023-09-25 PROCEDURE — 82306 VITAMIN D 25 HYDROXY: CPT | Performed by: NURSE PRACTITIONER

## 2023-09-25 PROCEDURE — 85025 COMPLETE CBC W/AUTO DIFF WBC: CPT | Performed by: NURSE PRACTITIONER

## 2023-09-25 PROCEDURE — 83735 ASSAY OF MAGNESIUM: CPT | Performed by: NURSE PRACTITIONER

## 2023-09-25 PROCEDURE — 93306 TTE W/DOPPLER COMPLETE: CPT

## 2023-09-25 PROCEDURE — 83036 HEMOGLOBIN GLYCOSYLATED A1C: CPT | Performed by: NURSE PRACTITIONER

## 2023-09-25 PROCEDURE — 93880 EXTRACRANIAL BILAT STUDY: CPT

## 2023-09-25 PROCEDURE — 84153 ASSAY OF PSA TOTAL: CPT | Performed by: NURSE PRACTITIONER

## 2023-09-25 PROCEDURE — 80053 COMPREHEN METABOLIC PANEL: CPT | Performed by: NURSE PRACTITIONER

## 2023-09-25 PROCEDURE — 82607 VITAMIN B-12: CPT | Performed by: NURSE PRACTITIONER

## 2023-09-25 PROCEDURE — 80061 LIPID PANEL: CPT | Performed by: NURSE PRACTITIONER

## 2023-09-25 PROCEDURE — 78452 HT MUSCLE IMAGE SPECT MULT: CPT

## 2023-09-25 RX ADMIN — TECHNETIUM TC 99M SESTAMIBI 1 DOSE: 1 INJECTION INTRAVENOUS at 09:55

## 2023-09-25 RX ADMIN — TECHNETIUM TC 99M SESTAMIBI 1 DOSE: 1 INJECTION INTRAVENOUS at 08:48

## 2023-09-26 LAB
25(OH)D3+25(OH)D2 SERPL-MCNC: 27.1 NG/ML (ref 30–100)
BH CV ECHO MEAS - ACS: 2.17 CM
BH CV ECHO MEAS - AI P1/2T: 667.2 MSEC
BH CV ECHO MEAS - AO MAX PG: 5.4 MMHG
BH CV ECHO MEAS - AO MEAN PG: 3 MMHG
BH CV ECHO MEAS - AO ROOT DIAM: 3.9 CM
BH CV ECHO MEAS - AO V2 MAX: 116 CM/SEC
BH CV ECHO MEAS - AO V2 VTI: 26.6 CM
BH CV ECHO MEAS - EDV(CUBED): 213.8 ML
BH CV ECHO MEAS - EDV(MOD-SP4): 85.8 ML
BH CV ECHO MEAS - EF(MOD-SP4): 52.1 %
BH CV ECHO MEAS - EF_3D-VOL: 54 %
BH CV ECHO MEAS - ESV(CUBED): 53.2 ML
BH CV ECHO MEAS - ESV(MOD-SP4): 41.1 ML
BH CV ECHO MEAS - FS: 37.1 %
BH CV ECHO MEAS - IVS/LVPW: 0.95 CM
BH CV ECHO MEAS - IVSD: 1.06 CM
BH CV ECHO MEAS - LA DIMENSION: 4 CM
BH CV ECHO MEAS - LAT PEAK E' VEL: 8.3 CM/SEC
BH CV ECHO MEAS - LV DIASTOLIC VOL/BSA (35-75): 42.3 CM2
BH CV ECHO MEAS - LV MASS(C)D: 273.1 GRAMS
BH CV ECHO MEAS - LV SYSTOLIC VOL/BSA (12-30): 20.2 CM2
BH CV ECHO MEAS - LVIDD: 6 CM
BH CV ECHO MEAS - LVIDS: 3.8 CM
BH CV ECHO MEAS - LVPWD: 1.11 CM
BH CV ECHO MEAS - MED PEAK E' VEL: 7 CM/SEC
BH CV ECHO MEAS - MR MAX PG: 117.5 MMHG
BH CV ECHO MEAS - MR MAX VEL: 542 CM/SEC
BH CV ECHO MEAS - MR MEAN PG: 79.1 MMHG
BH CV ECHO MEAS - MR MEAN VEL: 428.1 CM/SEC
BH CV ECHO MEAS - MR VTI: 219.3 CM
BH CV ECHO MEAS - MV A MAX VEL: 52.7 CM/SEC
BH CV ECHO MEAS - MV DEC TIME: 0.26 SEC
BH CV ECHO MEAS - MV E MAX VEL: 53.6 CM/SEC
BH CV ECHO MEAS - MV E/A: 1.02
BH CV ECHO MEAS - RAP SYSTOLE: 10 MMHG
BH CV ECHO MEAS - RVSP: 30.9 MMHG
BH CV ECHO MEAS - SI(MOD-SP4): 22 ML/M2
BH CV ECHO MEAS - SV(MOD-SP4): 44.7 ML
BH CV ECHO MEAS - TR MAX PG: 20.9 MMHG
BH CV ECHO MEAS - TR MAX VEL: 228.6 CM/SEC
BH CV ECHO MEASUREMENTS AVERAGE E/E' RATIO: 7.01
BH CV REST NUCLEAR ISOTOPE DOSE: 10 MCI
BH CV STRESS DURATION MIN STAGE 1: 3
BH CV STRESS DURATION SEC STAGE 1: 0
BH CV STRESS GRADE STAGE 1: 10
BH CV STRESS METS STAGE 1: 5
BH CV STRESS NUCLEAR ISOTOPE DOSE: 30 MCI
BH CV STRESS PROTOCOL 1: NORMAL
BH CV STRESS RECOVERY BP: NORMAL MMHG
BH CV STRESS RECOVERY HR: 66 BPM
BH CV STRESS SPEED STAGE 1: 1.7
BH CV STRESS STAGE 1: 1
BH CV XLRA - RV BASE: 3 CM
BH CV XLRA - RV LENGTH: 6.8 CM
BH CV XLRA - RV MID: 1.87 CM
LEFT ATRIUM VOLUME INDEX: 36.7 ML/M2
LV EF NUC BP: 59 %
MAXIMAL PREDICTED HEART RATE: 143 BPM
PERCENT MAX PREDICTED HR: 84.62 %
STRESS BASELINE BP: NORMAL MMHG
STRESS BASELINE HR: 59 BPM
STRESS PERCENT HR: 100 %
STRESS POST ESTIMATED WORKLOAD: 7 METS
STRESS POST EXERCISE DUR MIN: 6 MIN
STRESS POST PEAK BP: NORMAL MMHG
STRESS POST PEAK HR: 121 BPM
STRESS TARGET HR: 122 BPM
VIT B12 SERPL-MCNC: 541 PG/ML (ref 232–1245)

## 2024-01-02 RX ORDER — FLECAINIDE ACETATE 50 MG/1
TABLET ORAL
Qty: 270 TABLET | Refills: 0 | Status: SHIPPED | OUTPATIENT
Start: 2024-01-02

## 2024-02-15 RX ORDER — APIXABAN 5 MG/1
5 TABLET, FILM COATED ORAL EVERY 12 HOURS
Qty: 180 TABLET | Refills: 3 | Status: SHIPPED | OUTPATIENT
Start: 2024-02-15

## 2024-03-05 RX ORDER — FLECAINIDE ACETATE 50 MG/1
TABLET ORAL
Qty: 270 TABLET | Refills: 0 | Status: SHIPPED | OUTPATIENT
Start: 2024-03-05 | End: 2024-03-11 | Stop reason: SDUPTHER

## 2024-03-11 ENCOUNTER — TELEPHONE (OUTPATIENT)
Dept: CARDIOLOGY | Facility: CLINIC | Age: 79
End: 2024-03-11
Payer: MEDICARE

## 2024-03-11 RX ORDER — FLECAINIDE ACETATE 50 MG/1
TABLET ORAL
Qty: 270 TABLET | Refills: 0 | Status: SHIPPED | OUTPATIENT
Start: 2024-03-11

## 2024-03-11 NOTE — TELEPHONE ENCOUNTER
----- Message from Gricelda Arguello sent at 3/11/2024  7:50 AM EDT -----  Regarding: FW: Appointment Cancellation Request  Contact: [phone]    ----- Message -----  From: Jonn Cespedes  Sent: 3/9/2024   1:33 PM EDT  [facility]  Subject: Appointment Cancellation Request                 Jonn Cespedes would like to cancel the following appointments:    [facility] [employer] [facility] ([phone][facility] 3/20/2024 12:15 PM    Comments:  I am going to be in [place] that week...my flecanide was filled in [place]; I currently am in [place] and will run out of flecanide Monday...could I get a few pills prescribed to [facility] [place] to last me until next Saturday when I'm in [place] again...thx

## 2024-03-11 NOTE — TELEPHONE ENCOUNTER
Caller: Jonn Cespedes    Relationship: Self    Best call back number: [phone]    What is the best time to reach you: ANY    Who are you requesting to speak with (clinical staff, provider,  specific staff member): VERONICA      What was the call regarding: PATIENT ADVISING THE NEW REFILL OF FLECAINIDE IS IN [place] AND HE NEEDS ENOUGH TO DO THROUGH SATURDAY UNTIL HE GETS THERE. PLEASE ADVISE THE PATIENT ASAP HE IS LEAVING FRIDAY FOR [place].     Is it okay if the provider responds through MyChart: NO

## 2024-05-09 RX ORDER — FLECAINIDE ACETATE 50 MG/1
TABLET ORAL
Qty: 270 TABLET | Refills: 3 | Status: SHIPPED | OUTPATIENT
Start: 2024-05-09

## 2024-08-19 ENCOUNTER — OFFICE VISIT (OUTPATIENT)
Dept: CARDIOLOGY | Facility: CLINIC | Age: 79
End: 2024-08-19
Payer: MEDICARE

## 2024-08-19 VITALS
HEART RATE: 68 BPM | WEIGHT: 195.8 LBS | HEIGHT: 69 IN | DIASTOLIC BLOOD PRESSURE: 60 MMHG | SYSTOLIC BLOOD PRESSURE: 108 MMHG | BODY MASS INDEX: 29 KG/M2

## 2024-08-19 DIAGNOSIS — E78.2 MIXED HYPERLIPIDEMIA: ICD-10-CM

## 2024-08-19 DIAGNOSIS — I48.0 PAF (PAROXYSMAL ATRIAL FIBRILLATION): Primary | ICD-10-CM

## 2024-08-19 DIAGNOSIS — Z79.899 LONG TERM CURRENT USE OF ANTIARRHYTHMIC DRUG: ICD-10-CM

## 2024-08-19 DIAGNOSIS — I10 PRIMARY HYPERTENSION: ICD-10-CM

## 2024-08-19 DIAGNOSIS — Z79.01 CURRENT USE OF LONG TERM ANTICOAGULATION: ICD-10-CM

## 2024-08-19 NOTE — PROGRESS NOTES
"Chief Complaint   Patient presents with    Follow-up     Cardiac management    Lab     Last labs in chart.    Shortness of Breath     He feels related to weight.     Dizziness     Has random episodes of dizziness, will even notice while sitting. He feels related to vertigo.     Med Refill     Does not need refills at this time.      Subjective       Jonn Cespedes is a 79 y.o. male with hypertension, hypercholesterolemia and PAF. He also had short runs of VT and underwent EP study in 2011 but no inducible VT. Multiple PAC and AF managed with flecainide. Cardiac workup in 2016 was negative for ischemia and slightly diminished LV function at 46%. He takes flecainide 100 mg in am and 50 mg in pm with an occasional extra 50 mg at night.  CHADsVASc score is 2. He is managed with Eliquis.  At his May 2019 office visit the dose of Valsartan was decreased due to hypotension.      He contacted the office for early follow up due to symptoms of left sided chest pressure, light-headedness. \"Something just doesn't feel right\". 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 Nuclear stress and echo repeated showing no ischemia, normal LV function. Valsartan increased to 80 mg BID after bp 190 systolic at peak exercise. Lipids well controlled with LDL 86, HDL 62, Tri 55.     He returns today for follow up. Denies CP, SOB, palpitations, dizziness, TIA or bleed. He does admit to \"feeling older and a little harder time walking the golf course especially up inclines\". He is planning to spend more time in [place] this winter. Plans to see Dr. Weiss for next labs. Treated with abx for prostatitis.        Cardiac History:    Past Surgical History:   Procedure Laterality Date    CARDIAC CATHETERIZATION  01/30/2012    Cath-Normal Coronaries. EF 45%    CARDIOVASCULAR STRESS TEST  01/11/2012    Stress-6min, 89%THR, 170-76, EF 46%, Negative    " CARDIOVASCULAR STRESS TEST  08/11/2016    6 min 16 sec, 87% THr, 172/76, no definite ischemia    CARDIOVASCULAR STRESS TEST  08/11/2016    EF 46%, no ischemia    CARDIOVASCULAR STRESS TEST  09/25/2023    6 Min. 7.0 METS. 84% THR. 190/65. EF 59%. Negative    CONVERTED (HISTORICAL) HOLTER  10/25/2011    Holter-freq PAC's and PVC's, freq runs V-tach    ECHO - CONVERTED  10/28/2011    Echo=EF 50%. RVSP-47mmHg.    ECHO - CONVERTED  07/23/2013    Echo-EF 60-65%, mild to mod MR.    ECHO - CONVERTED  08/09/2016    EF 60-65%, mild MR, mild AR, RVSP 30-35 mmHg    ECHO - CONVERTED  09/25/2023    TLS. EF 60%. LA- 4.0. Mod MR. Mild AI. RVSP- 31 mmHg    EP STUDY  12/20/2011    EP Study () no inducible V-tach, multifocal PACs and atrial fibrillation     Current Outpatient Medications   Medication Sig Dispense Refill    apixaban (Eliquis) 5 MG tablet tablet TAKE 1 TABLET BY MOUTH EVERY 12 HOURS 180 tablet 3    flecainide (TAMBOCOR) 50 MG tablet TAKE 2 TABLETS BY MOUTH IN THE MORNING AND 2 TABLETS AT NIGHT 270 tablet 3    folic acid (FOLVITE) 1 MG tablet Take 1 tablet by mouth Daily.      methotrexate (RHEUMATREX) 2.5 MG tablet Take 5 tablets by mouth once a week.      pravastatin (PRAVACHOL) 40 MG tablet Take 1 tablet by mouth Daily. 90 tablet 3    valsartan (DIOVAN) 160 MG tablet Take 0.5 tablets by mouth Daily. (Patient taking differently: Take 0.5 tablets by mouth 2 (Two) Times a Day.) 90 tablet 3     No current facility-administered medications for this visit.     Patient has no known allergies.    Past Medical History:   Diagnosis Date    A-fib     Arrhythmia Aug 15    Deep vein thrombosis     History of cholecystectomy     Hx of squamous cell carcinoma     Hyperlipidemia     Hypertension     RA (rheumatoid arthritis)      Social History     Socioeconomic History    Marital status:    Tobacco Use    Smoking status: Never    Smokeless tobacco: Never   Vaping Use    Vaping status: Never Used   Substance and  "Sexual Activity    Alcohol use: Not Currently     Comment: rarely    Drug use: No    Sexual activity: Defer     Family History   Problem Relation Age of Onset    No Known Problems Mother      Review of Systems   Constitutional: Positive for weight gain. Negative for decreased appetite and malaise/fatigue.   HENT: Negative.     Eyes:  Negative for blurred vision.   Cardiovascular:  Positive for dyspnea on exertion (mild). Negative for chest pain, leg swelling, palpitations and syncope.   Respiratory:  Negative for shortness of breath and sleep disturbances due to breathing.    Endocrine: Negative.    Hematologic/Lymphatic: Negative for bleeding problem. Does not bruise/bleed easily.   Skin: Negative.    Musculoskeletal:  Negative for falls and myalgias.   Gastrointestinal:  Negative for abdominal pain, heartburn and melena.   Genitourinary:  Negative for hematuria.   Neurological:  Negative for dizziness and light-headedness.   Psychiatric/Behavioral:  Negative for altered mental status.    Allergic/Immunologic: Negative.       Objective     /60 (BP Location: Left arm, Patient Position: Sitting)   Pulse 68   Ht 175.3 cm (69.02\")   Wt 88.8 kg (195 lb 12.8 oz)   BMI 28.90 kg/m²     Vitals and nursing note reviewed.   Constitutional:       General: Not in acute distress.     Appearance: Well-developed. Not diaphoretic.   Eyes:      Pupils: Pupils are equal, round, and reactive to light.   HENT:      Head: Normocephalic.   Pulmonary:      Effort: Pulmonary effort is normal. No respiratory distress.      Breath sounds: Normal breath sounds.   Cardiovascular:      Normal rate. Regular rhythm.   Pulses:     Intact distal pulses.   Edema:     Peripheral edema absent.   Abdominal:      General: Bowel sounds are normal.      Palpations: Abdomen is soft.   Musculoskeletal: Normal range of motion.      Cervical back: Normal range of motion. Skin:     General: Skin is warm and dry.   Neurological:      Mental Status: " Alert and oriented to person, place, and time.          ECG 12 Lead    Date/Time: 8/19/2024 12:44 PM  Performed by: Michelle Kuo APRN    Authorized by: Michelle Kuo APRN  Comparison: compared with previous ECG from 9/5/2023  Similar to previous ECG  Rhythm: sinus rhythm  Rate: normal  BPM: 68  ST Segments: ST segments normal    Clinical impression: normal ECG              Problem List Items Addressed This Visit          Cardiac and Vasculature    PAF (paroxysmal atrial fibrillation) - Primary    HTN (hypertension)    Hyperlipemia    Long term current use of antiarrhythmic drug       Coag and Thromboembolic    Current use of long term anticoagulation     PAF  -EKG is sinus at 68, normal QT  -continue flecainide, Eliquis     HTN  -well controlled with valsartan 80 mg BID    -Continue low-sodium diet.     Hyperlipidemia  -LDL 86, HDL 62   -continue pravastatin     Light-headedness  -carotid US negative       Follow-up in 6 months.      BMI is >= 25 and <30. (Overweight) The following options were offered after discussion;: nutrition counseling/recommendations               Electronically signed by MARINO Jimenes,  August 19, 2024 12:43 EDT

## 2024-10-24 ENCOUNTER — TELEPHONE (OUTPATIENT)
Dept: CARDIOLOGY | Facility: CLINIC | Age: 79
End: 2024-10-24

## 2024-10-24 NOTE — TELEPHONE ENCOUNTER
REQUEST FOR CARDIAC CLEARANCE    Caller name: Jonn Cespedes     Phone Number: [phone]    Surgeon's name: DR RAKESH NICKERSON FOR MOHS AND DR LEAH CARRENO FOR CLOSURE    Type of planned surgery: MOHS    Date of planned surgery: 12.11.24    Type of anesthesia: UNKNOWN    Have you been experiencing chest pain or shortness of breath? NO    Is your doctor requesting for you to stop any of your medications prior to your surgery? ELIQUIS 5 MG    Where should we fax the clearance to? [phone]

## 2024-11-20 ENCOUNTER — TELEPHONE (OUTPATIENT)
Dept: CARDIOLOGY | Facility: CLINIC | Age: 79
End: 2024-11-20
Payer: MEDICARE

## 2024-11-20 NOTE — TELEPHONE ENCOUNTER
Received fax from [facility] for cardiac clearance for patient to have a right lower lid/check myocutaneous flap, right TC Carr flap under GA: 30 minutes. Patient is on Eliquis and they are requesting to hold. According to our records, I do not see where patient has had any stenting. Patient has a history of afib and DVT.          Fax [phone]  Attn: Jade Fuentes

## 2025-02-24 ENCOUNTER — OFFICE VISIT (OUTPATIENT)
Dept: CARDIOLOGY | Facility: CLINIC | Age: 80
End: 2025-02-24
Payer: MEDICARE

## 2025-02-24 VITALS
DIASTOLIC BLOOD PRESSURE: 84 MMHG | WEIGHT: 193.4 LBS | HEIGHT: 69 IN | SYSTOLIC BLOOD PRESSURE: 120 MMHG | HEART RATE: 66 BPM | BODY MASS INDEX: 28.64 KG/M2

## 2025-02-24 DIAGNOSIS — I10 PRIMARY HYPERTENSION: ICD-10-CM

## 2025-02-24 DIAGNOSIS — E78.2 MIXED HYPERLIPIDEMIA: ICD-10-CM

## 2025-02-24 DIAGNOSIS — Z79.01 CURRENT USE OF LONG TERM ANTICOAGULATION: ICD-10-CM

## 2025-02-24 DIAGNOSIS — Z79.899 LONG TERM CURRENT USE OF ANTIARRHYTHMIC DRUG: ICD-10-CM

## 2025-02-24 DIAGNOSIS — I48.0 PAF (PAROXYSMAL ATRIAL FIBRILLATION): Primary | ICD-10-CM

## 2025-02-24 RX ORDER — PRAVASTATIN SODIUM 40 MG
40 TABLET ORAL DAILY
Qty: 90 TABLET | Refills: 3 | Status: SHIPPED | OUTPATIENT
Start: 2025-02-24

## 2025-02-24 RX ORDER — FLECAINIDE ACETATE 50 MG/1
TABLET ORAL
Qty: 270 TABLET | Refills: 3 | Status: SHIPPED | OUTPATIENT
Start: 2025-02-24

## 2025-02-24 RX ORDER — VALSARTAN 160 MG/1
80 TABLET ORAL 2 TIMES DAILY
Qty: 180 TABLET | Refills: 3 | Status: SHIPPED | OUTPATIENT
Start: 2025-02-24

## 2025-02-24 NOTE — PROGRESS NOTES
"Chief Complaint   Patient presents with    Follow-up     Cardiac management    Lab     Unsure of last labs.    Med Refill     Needs refills on cardiac medications-90 day     Subjective       Jonn Cespedes is a 80 y.o. male with hypertension, hypercholesterolemia and PAF. He also had short runs of VT and underwent EP study in 2011 but no inducible VT. Multiple PAC and AF managed with flecainide. Cardiac workup in 2016 was negative for ischemia and slightly diminished LV function at 46%. He takes flecainide 100 mg in am and 50 mg in pm with an occasional extra 50 mg at night.  CHADsVASc score is 2. He is managed with Eliquis.  At his May 2019 office visit the dose of Valsartan was decreased due to hypotension.      He contacted the office for early follow up due to symptoms of left sided chest pressure, light-headedness. \"Something just doesn't feel right\". 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 Nuclear stress and echo repeated showing no ischemia, normal LV function. Valsartan increased to 80 mg BID after bp 190 systolic at peak exercise. Lipids well controlled with LDL 86, HDL 62, Tri 55.      He returns today for follow up. Denies CP, SOB, palpitations, dizziness, TIA or bleed. BP well controlled. Unfortunately since last visit his wife passed away from pancreatic cancer. He underwent surgery on right eye with Dr. Colvin. He prefers to have labs checked with Dr. Weiss. Refills requested.        Cardiac History:    Past Surgical History:   Procedure Laterality Date    CARDIAC CATHETERIZATION  01/30/2012    Cath-Normal Coronaries. EF 45%    CARDIOVASCULAR STRESS TEST  01/11/2012    Stress-6min, 89%THR, 170-76, EF 46%, Negative    CARDIOVASCULAR STRESS TEST  08/11/2016    6 min 16 sec, 87% THr, 172/76, no definite ischemia    CARDIOVASCULAR STRESS TEST  08/11/2016    EF 46%, no ischemia    CARDIOVASCULAR STRESS " TEST  09/25/2023    6 Min. 7.0 METS. 84% THR. 190/65. EF 59%. Negative    CONVERTED (HISTORICAL) HOLTER  10/25/2011    Holter-freq PAC's and PVC's, freq runs V-tach    ECHO - CONVERTED  10/28/2011    Echo=EF 50%. RVSP-47mmHg.    ECHO - CONVERTED  07/23/2013    Echo-EF 60-65%, mild to mod MR.    ECHO - CONVERTED  08/09/2016    EF 60-65%, mild MR, mild AR, RVSP 30-35 mmHg    ECHO - CONVERTED  09/25/2023    TLS. EF 60%. LA- 4.0. Mod MR. Mild AI. RVSP- 31 mmHg    EP STUDY  12/20/2011    EP Study () no inducible V-tach, multifocal PACs and atrial fibrillation    US CAROTID UNILATERAL  09/25/2023    no stenosis     Current Outpatient Medications   Medication Sig Dispense Refill    apixaban (Eliquis) 5 MG tablet tablet Take 1 tablet by mouth Every 12 (Twelve) Hours. 180 tablet 3    flecainide (TAMBOCOR) 50 MG tablet TAKE 2 TABLETS BY MOUTH IN THE MORNING AND 2 TABLETS AT NIGHT 270 tablet 3    folic acid (FOLVITE) 1 MG tablet Take 1 tablet by mouth Daily.      methotrexate (RHEUMATREX) 2.5 MG tablet Take 5 tablets by mouth once a week.      pravastatin (PRAVACHOL) 40 MG tablet Take 1 tablet by mouth Daily. 90 tablet 3    valsartan (DIOVAN) 160 MG tablet Take 0.5 tablets by mouth 2 (Two) Times a Day. 180 tablet 3     No current facility-administered medications for this visit.     Patient has no known allergies.    Past Medical History:   Diagnosis Date    A-fib     Arrhythmia Aug 15    Deep vein thrombosis     H/O eye surgery     right eye lid    History of cholecystectomy     Hx of squamous cell carcinoma     Hyperlipidemia     Hypertension     RA (rheumatoid arthritis)      Social History     Socioeconomic History    Marital status:    Tobacco Use    Smoking status: Never     Passive exposure: Past    Smokeless tobacco: Never   Vaping Use    Vaping status: Never Used   Substance and Sexual Activity    Alcohol use: Not Currently     Comment: rarely    Drug use: No    Sexual activity: Defer     Family  "History   Problem Relation Age of Onset    No Known Problems Mother      Review of Systems   Constitutional: Positive for weight loss (-2). Negative for decreased appetite and malaise/fatigue.   HENT: Negative.     Eyes:  Negative for blurred vision.   Cardiovascular:  Negative for chest pain, dyspnea on exertion, leg swelling, palpitations and syncope.   Respiratory:  Negative for shortness of breath and sleep disturbances due to breathing.    Endocrine: Negative.    Hematologic/Lymphatic: Negative for bleeding problem. Does not bruise/bleed easily.   Skin: Negative.    Musculoskeletal:  Negative for falls and myalgias.   Gastrointestinal:  Negative for abdominal pain, heartburn and melena.   Genitourinary:  Negative for hematuria.   Neurological:  Negative for dizziness and light-headedness.   Psychiatric/Behavioral:  Negative for altered mental status.    Allergic/Immunologic: Negative.       Objective     /84 (BP Location: Left arm, Patient Position: Sitting)   Pulse 66   Ht 175.3 cm (69.02\")   Wt 87.7 kg (193 lb 6.4 oz)   BMI 28.55 kg/m²     Vitals and nursing note reviewed.   Constitutional:       General: Not in acute distress.     Appearance: Well-developed. Not diaphoretic.   Eyes:      Pupils: Pupils are equal, round, and reactive to light.   HENT:      Head: Normocephalic.   Pulmonary:      Effort: Pulmonary effort is normal. No respiratory distress.      Breath sounds: Normal breath sounds.   Cardiovascular:      Normal rate. Regular rhythm.   Pulses:     Intact distal pulses.   Edema:     Peripheral edema absent.   Abdominal:      General: Bowel sounds are normal.      Palpations: Abdomen is soft.   Musculoskeletal: Normal range of motion.      Cervical back: Normal range of motion. Skin:     General: Skin is warm and dry.   Neurological:      Mental Status: Alert and oriented to person, place, and time.          ECG 12 Lead    Date/Time: 2/25/2025 5:30 PM  Performed by: Michelle Kuo, " MARINO    Authorized by: Michelle Kuo APRN  Comparison: compared with previous ECG   Rhythm: sinus rhythm  Rate: normal  BPM: 66  ST Segments: ST segments normal    Clinical impression: non-specific ECG  Comments: QTc 457 ms              Problem List Items Addressed This Visit          Cardiac and Vasculature    PAF (paroxysmal atrial fibrillation) - Primary    Relevant Orders    ECG 12 Lead    HTN (hypertension)    Relevant Medications    valsartan (DIOVAN) 160 MG tablet    Hyperlipemia    Relevant Medications    pravastatin (PRAVACHOL) 40 MG tablet    Long term current use of antiarrhythmic drug       Coag and Thromboembolic    Current use of long term anticoagulation      PAF  -EKG is sinus at 66, normal QT  -continue flecainide, Eliquis  -refills sent      HTN  -well controlled with valsartan 80 mg BID    -Continue low-sodium diet.     Hyperlipidemia  -LDL 86, HDL 62   -continue pravastatin  -he will repeat labs with Dr. Weiss      Light-headedness  -carotid US negative       Condolences given regarding the loss of his wife. Cardiac status is stable. Follow-up in 6 months.                Electronically signed by MARINO Jimenes,  February 26, 2025 10:10 EST

## 2025-03-03 ENCOUNTER — TELEPHONE (OUTPATIENT)
Dept: CARDIOLOGY | Facility: CLINIC | Age: 80
End: 2025-03-03
Payer: MEDICARE

## 2025-03-03 RX ORDER — ALBUTEROL SULFATE 90 UG/1
2 INHALANT RESPIRATORY (INHALATION) EVERY 4 HOURS PRN
Qty: 18 G | Refills: 1 | Status: SHIPPED | OUTPATIENT
Start: 2025-03-03

## 2025-03-03 RX ORDER — FLUTICASONE FUROATE AND VILANTEROL 100; 25 UG/1; UG/1
1 POWDER RESPIRATORY (INHALATION)
Qty: 1 EACH | Refills: 3 | Status: SHIPPED | OUTPATIENT
Start: 2025-03-03

## 2025-03-03 NOTE — TELEPHONE ENCOUNTER
Pt had faint exp wheeze he attributed to allergies/congestion.     Will try albuterol inhaler PRN and Breo daily.     Can we advise him to rinse mouth after Breo. Albuterol may cause increase heart rate, monitor for response and limit use if he develops tachycardia.     If this does not help, recommend he see Dr. Weiss.

## 2025-03-03 NOTE — TELEPHONE ENCOUNTER
Patient called, states trying an inhaler was discussed at his recent visit with Michelle and he would like to proceed with trying an inhaler for his SOA.  He would like script sent to [facility] in [place].

## 2025-03-03 NOTE — TELEPHONE ENCOUNTER
Patient made aware albuterol inhaler PRN and Breo daily has been sent to pharmacy. Patient made aware to rinse mouth after Breo. Aware Albuterol may cause increased heart rate, monitor for respone and limit use if he develops tachycardia. Aware if this does not help, recommend he see Dr Weiss. Patient voiced understanding.

## 2025-03-04 NOTE — TELEPHONE ENCOUNTER
Spoke with Chad Salas concerning having breo and albuterol transferred to pharmacy from [facility]. [facility] is calling [facility] for transfer of medication.

## 2025-08-26 ENCOUNTER — OFFICE VISIT (OUTPATIENT)
Dept: CARDIOLOGY | Facility: CLINIC | Age: 80
End: 2025-08-26
Payer: MEDICARE

## 2025-08-26 VITALS
WEIGHT: 190.8 LBS | BODY MASS INDEX: 28.26 KG/M2 | HEIGHT: 69 IN | SYSTOLIC BLOOD PRESSURE: 122 MMHG | DIASTOLIC BLOOD PRESSURE: 70 MMHG | HEART RATE: 64 BPM

## 2025-08-26 DIAGNOSIS — E78.2 MIXED HYPERLIPIDEMIA: ICD-10-CM

## 2025-08-26 DIAGNOSIS — R06.02 SHORTNESS OF BREATH: ICD-10-CM

## 2025-08-26 DIAGNOSIS — I10 PRIMARY HYPERTENSION: ICD-10-CM

## 2025-08-26 DIAGNOSIS — I34.0 NON-RHEUMATIC MITRAL REGURGITATION: ICD-10-CM

## 2025-08-26 DIAGNOSIS — Z79.899 LONG TERM CURRENT USE OF ANTIARRHYTHMIC DRUG: ICD-10-CM

## 2025-08-26 DIAGNOSIS — I48.0 PAF (PAROXYSMAL ATRIAL FIBRILLATION): Primary | ICD-10-CM

## 2025-08-26 DIAGNOSIS — E55.9 VITAMIN D DEFICIENCY: ICD-10-CM

## 2025-08-26 DIAGNOSIS — R73.9 HYPERGLYCEMIA: ICD-10-CM

## 2025-08-26 PROCEDURE — 99214 OFFICE O/P EST MOD 30 MIN: CPT | Performed by: NURSE PRACTITIONER

## 2025-08-26 PROCEDURE — 3078F DIAST BP <80 MM HG: CPT | Performed by: NURSE PRACTITIONER

## 2025-08-26 PROCEDURE — 1160F RVW MEDS BY RX/DR IN RCRD: CPT | Performed by: NURSE PRACTITIONER

## 2025-08-26 PROCEDURE — 3074F SYST BP LT 130 MM HG: CPT | Performed by: NURSE PRACTITIONER

## 2025-08-26 PROCEDURE — 1159F MED LIST DOCD IN RCRD: CPT | Performed by: NURSE PRACTITIONER

## 2025-08-26 RX ORDER — PRAVASTATIN SODIUM 40 MG
40 TABLET ORAL DAILY
Qty: 90 TABLET | Refills: 3 | Status: SHIPPED | OUTPATIENT
Start: 2025-08-26

## 2025-08-26 RX ORDER — FLECAINIDE ACETATE 50 MG/1
TABLET ORAL
Qty: 270 TABLET | Refills: 3 | Status: SHIPPED | OUTPATIENT
Start: 2025-08-26

## 2025-08-26 RX ORDER — VALSARTAN 160 MG/1
80 TABLET ORAL 2 TIMES DAILY
Qty: 180 TABLET | Refills: 3 | Status: SHIPPED | OUTPATIENT
Start: 2025-08-26

## 2025-08-27 PROCEDURE — 93000 ELECTROCARDIOGRAM COMPLETE: CPT | Performed by: NURSE PRACTITIONER
